# Patient Record
Sex: MALE | Race: WHITE | NOT HISPANIC OR LATINO | Employment: OTHER | RURAL
[De-identification: names, ages, dates, MRNs, and addresses within clinical notes are randomized per-mention and may not be internally consistent; named-entity substitution may affect disease eponyms.]

---

## 2021-10-28 ENCOUNTER — OFFICE VISIT (OUTPATIENT)
Dept: FAMILY MEDICINE | Facility: CLINIC | Age: 81
End: 2021-10-28
Payer: MEDICARE

## 2021-10-28 VITALS
BODY MASS INDEX: 25.68 KG/M2 | HEART RATE: 66 BPM | TEMPERATURE: 97 F | OXYGEN SATURATION: 97 % | SYSTOLIC BLOOD PRESSURE: 154 MMHG | WEIGHT: 173.38 LBS | HEIGHT: 69 IN | DIASTOLIC BLOOD PRESSURE: 74 MMHG

## 2021-10-28 DIAGNOSIS — E11.9 TYPE 2 DIABETES MELLITUS WITHOUT COMPLICATION, WITHOUT LONG-TERM CURRENT USE OF INSULIN: Primary | ICD-10-CM

## 2021-10-28 DIAGNOSIS — I10 ESSENTIAL HYPERTENSION: ICD-10-CM

## 2021-10-28 DIAGNOSIS — Z23 IMMUNIZATION DUE: ICD-10-CM

## 2021-10-28 DIAGNOSIS — M19.90 ARTHRITIS: ICD-10-CM

## 2021-10-28 LAB
EST. AVERAGE GLUCOSE BLD GHB EST-MCNC: 104 MG/DL
HBA1C MFR BLD HPLC: 5.7 % (ref 4.5–6.6)

## 2021-10-28 PROCEDURE — 99213 PR OFFICE/OUTPT VISIT, EST, LEVL III, 20-29 MIN: ICD-10-PCS | Mod: ,,, | Performed by: NURSE PRACTITIONER

## 2021-10-28 PROCEDURE — 90662 FLU VACCINE - QUADRIVALENT - HIGH DOSE (65+) PRESERVATIVE FREE IM: ICD-10-PCS | Mod: ,,, | Performed by: NURSE PRACTITIONER

## 2021-10-28 PROCEDURE — 90662 IIV NO PRSV INCREASED AG IM: CPT | Mod: ,,, | Performed by: NURSE PRACTITIONER

## 2021-10-28 PROCEDURE — 99213 OFFICE O/P EST LOW 20 MIN: CPT | Mod: ,,, | Performed by: NURSE PRACTITIONER

## 2021-10-28 PROCEDURE — G0008 FLU VACCINE - QUADRIVALENT - HIGH DOSE (65+) PRESERVATIVE FREE IM: ICD-10-PCS | Mod: ,,, | Performed by: NURSE PRACTITIONER

## 2021-10-28 PROCEDURE — G0008 ADMIN INFLUENZA VIRUS VAC: HCPCS | Mod: ,,, | Performed by: NURSE PRACTITIONER

## 2021-10-28 PROCEDURE — 83036 HEMOGLOBIN GLYCOSYLATED A1C: CPT | Mod: ,,, | Performed by: CLINICAL MEDICAL LABORATORY

## 2021-10-28 PROCEDURE — 83036 HEMOGLOBIN A1C: ICD-10-PCS | Mod: ,,, | Performed by: CLINICAL MEDICAL LABORATORY

## 2021-10-28 RX ORDER — AMLODIPINE BESYLATE 10 MG/1
10 TABLET ORAL DAILY
COMMUNITY
End: 2021-10-28 | Stop reason: SDUPTHER

## 2021-10-28 RX ORDER — CALCIUM CITRATE/VITAMIN D3 200MG-6.25
TABLET ORAL 2 TIMES DAILY
COMMUNITY
Start: 2021-08-25 | End: 2022-04-21 | Stop reason: SDUPTHER

## 2021-10-28 RX ORDER — GLIPIZIDE 5 MG/1
5 TABLET, FILM COATED, EXTENDED RELEASE ORAL DAILY
COMMUNITY
Start: 2021-08-25 | End: 2021-10-28 | Stop reason: SDUPTHER

## 2021-10-28 RX ORDER — ALLOPURINOL 300 MG/1
300 TABLET ORAL DAILY
Qty: 90 TABLET | Refills: 1 | Status: SHIPPED | OUTPATIENT
Start: 2021-10-28 | End: 2022-01-26

## 2021-10-28 RX ORDER — LISINOPRIL 40 MG/1
40 TABLET ORAL DAILY
Qty: 90 TABLET | Refills: 1 | Status: SHIPPED | OUTPATIENT
Start: 2021-10-28 | End: 2022-04-21 | Stop reason: SDUPTHER

## 2021-10-28 RX ORDER — ALLOPURINOL 300 MG/1
300 TABLET ORAL DAILY
COMMUNITY
End: 2021-10-28 | Stop reason: SDUPTHER

## 2021-10-28 RX ORDER — GLIPIZIDE 5 MG/1
5 TABLET, FILM COATED, EXTENDED RELEASE ORAL DAILY
Qty: 90 TABLET | Refills: 1 | Status: SHIPPED | OUTPATIENT
Start: 2021-10-28 | End: 2022-04-21 | Stop reason: SDUPTHER

## 2021-10-28 RX ORDER — LISINOPRIL 40 MG/1
40 TABLET ORAL DAILY
COMMUNITY
Start: 2021-07-27 | End: 2021-10-28 | Stop reason: SDUPTHER

## 2021-10-28 RX ORDER — AMLODIPINE BESYLATE 10 MG/1
10 TABLET ORAL DAILY
Qty: 90 TABLET | Refills: 1 | Status: SHIPPED | OUTPATIENT
Start: 2021-10-28 | End: 2022-04-21 | Stop reason: SDUPTHER

## 2021-11-19 ENCOUNTER — IMMUNIZATION (OUTPATIENT)
Dept: FAMILY MEDICINE | Facility: CLINIC | Age: 81
End: 2021-11-19
Payer: MEDICARE

## 2021-11-19 DIAGNOSIS — Z23 NEED FOR VACCINATION: Primary | ICD-10-CM

## 2021-11-19 PROCEDURE — 91306 COVID-19, MRNA, LNP-S, PF, 100 MCG/0.25 ML DOSE VACCINE (MODERNA BOOSTER): ICD-10-PCS | Mod: ,,, | Performed by: NURSE PRACTITIONER

## 2021-11-19 PROCEDURE — 91306 COVID-19, MRNA, LNP-S, PF, 100 MCG/0.25 ML DOSE VACCINE (MODERNA BOOSTER): CPT | Mod: ,,, | Performed by: NURSE PRACTITIONER

## 2021-11-19 PROCEDURE — 0064A COVID-19, MRNA, LNP-S, PF, 100 MCG/0.25 ML DOSE VACCINE (MODERNA BOOSTER): ICD-10-PCS | Mod: ,,, | Performed by: NURSE PRACTITIONER

## 2021-11-19 PROCEDURE — 0064A COVID-19, MRNA, LNP-S, PF, 100 MCG/0.25 ML DOSE VACCINE (MODERNA BOOSTER): CPT | Mod: ,,, | Performed by: NURSE PRACTITIONER

## 2022-03-11 DIAGNOSIS — Z71.89 COMPLEX CARE COORDINATION: ICD-10-CM

## 2022-04-21 ENCOUNTER — OFFICE VISIT (OUTPATIENT)
Dept: FAMILY MEDICINE | Facility: CLINIC | Age: 82
End: 2022-04-21
Payer: MEDICARE

## 2022-04-21 VITALS
SYSTOLIC BLOOD PRESSURE: 150 MMHG | WEIGHT: 185 LBS | BODY MASS INDEX: 27.4 KG/M2 | DIASTOLIC BLOOD PRESSURE: 69 MMHG | OXYGEN SATURATION: 97 % | HEART RATE: 83 BPM | TEMPERATURE: 98 F | HEIGHT: 69 IN

## 2022-04-21 DIAGNOSIS — M19.90 ARTHRITIS: ICD-10-CM

## 2022-04-21 DIAGNOSIS — E11.9 TYPE 2 DIABETES MELLITUS WITHOUT COMPLICATION, WITHOUT LONG-TERM CURRENT USE OF INSULIN: Primary | ICD-10-CM

## 2022-04-21 DIAGNOSIS — I10 ESSENTIAL HYPERTENSION: ICD-10-CM

## 2022-04-21 DIAGNOSIS — E78.5 HYPERLIPIDEMIA, UNSPECIFIED HYPERLIPIDEMIA TYPE: ICD-10-CM

## 2022-04-21 LAB
ALBUMIN SERPL BCP-MCNC: 3.7 G/DL (ref 3.5–5)
ALBUMIN/GLOB SERPL: 1.1 {RATIO}
ALP SERPL-CCNC: 81 U/L (ref 45–115)
ALT SERPL W P-5'-P-CCNC: 36 U/L (ref 16–61)
ANION GAP SERPL CALCULATED.3IONS-SCNC: 9 MMOL/L (ref 7–16)
AST SERPL W P-5'-P-CCNC: 30 U/L (ref 15–37)
BASOPHILS # BLD AUTO: 0.09 K/UL (ref 0–0.2)
BASOPHILS NFR BLD AUTO: 0.8 % (ref 0–1)
BILIRUB SERPL-MCNC: 0.3 MG/DL (ref 0–1.2)
BILIRUB SERPL-MCNC: NEGATIVE MG/DL
BLOOD URINE, POC: NEGATIVE
BUN SERPL-MCNC: 19 MG/DL (ref 7–18)
BUN/CREAT SERPL: 16 (ref 6–20)
CALCIUM SERPL-MCNC: 9.1 MG/DL (ref 8.5–10.1)
CHLORIDE SERPL-SCNC: 109 MMOL/L (ref 98–107)
CHOLEST SERPL-MCNC: 152 MG/DL (ref 0–200)
CHOLEST/HDLC SERPL: 5.1 {RATIO}
CO2 SERPL-SCNC: 29 MMOL/L (ref 21–32)
COLOR, POC UA: YELLOW
CREAT SERPL-MCNC: 1.19 MG/DL (ref 0.7–1.3)
CREAT UR-MCNC: 109 MG/DL (ref 39–259)
DIFFERENTIAL METHOD BLD: ABNORMAL
EOSINOPHIL # BLD AUTO: 0.16 K/UL (ref 0–0.5)
EOSINOPHIL NFR BLD AUTO: 1.4 % (ref 1–4)
ERYTHROCYTE [DISTWIDTH] IN BLOOD BY AUTOMATED COUNT: 13.5 % (ref 11.5–14.5)
GLOBULIN SER-MCNC: 3.5 G/DL (ref 2–4)
GLUCOSE SERPL-MCNC: 119 MG/DL (ref 74–106)
GLUCOSE UR QL STRIP: NEGATIVE
HCT VFR BLD AUTO: 44 % (ref 40–54)
HDLC SERPL-MCNC: 30 MG/DL (ref 40–60)
HGB BLD-MCNC: 14.7 G/DL (ref 13.5–18)
IMM GRANULOCYTES # BLD AUTO: 0.1 K/UL (ref 0–0.04)
IMM GRANULOCYTES NFR BLD: 0.9 % (ref 0–0.4)
KETONES UR QL STRIP: NEGATIVE
LDLC SERPL CALC-MCNC: 82 MG/DL
LDLC/HDLC SERPL: 2.7 {RATIO}
LEUKOCYTE ESTERASE URINE, POC: NEGATIVE
LYMPHOCYTES # BLD AUTO: 1.44 K/UL (ref 1–4.8)
LYMPHOCYTES NFR BLD AUTO: 13 % (ref 27–41)
MCH RBC QN AUTO: 32.9 PG (ref 27–31)
MCHC RBC AUTO-ENTMCNC: 33.4 G/DL (ref 32–36)
MCV RBC AUTO: 98.4 FL (ref 80–96)
MICROALBUMIN UR-MCNC: 5.4 MG/DL (ref 0–2.8)
MICROALBUMIN/CREAT RATIO PNL UR: 49.5 MG/G (ref 0–30)
MONOCYTES # BLD AUTO: 1.11 K/UL (ref 0–0.8)
MONOCYTES NFR BLD AUTO: 10 % (ref 2–6)
MPC BLD CALC-MCNC: 12.5 FL (ref 9.4–12.4)
NEUTROPHILS # BLD AUTO: 8.18 K/UL (ref 1.8–7.7)
NEUTROPHILS NFR BLD AUTO: 73.9 % (ref 53–65)
NITRITE, POC UA: NEGATIVE
NONHDLC SERPL-MCNC: 122 MG/DL
NRBC # BLD AUTO: 0 X10E3/UL
NRBC, AUTO (.00): 0 %
PH, POC UA: 5
PLATELET # BLD AUTO: 172 K/UL (ref 150–400)
POTASSIUM SERPL-SCNC: 4.6 MMOL/L (ref 3.5–5.1)
PROT SERPL-MCNC: 7.2 G/DL (ref 6.4–8.2)
PROTEIN, POC: NEGATIVE
RBC # BLD AUTO: 4.47 M/UL (ref 4.6–6.2)
SODIUM SERPL-SCNC: 142 MMOL/L (ref 136–145)
SPECIFIC GRAVITY, POC UA: 1.02
TRIGL SERPL-MCNC: 202 MG/DL (ref 35–150)
UROBILINOGEN, POC UA: 0.2
VLDLC SERPL-MCNC: 40 MG/DL
WBC # BLD AUTO: 11.08 K/UL (ref 4.5–11)

## 2022-04-21 PROCEDURE — 83036 HEMOGLOBIN GLYCOSYLATED A1C: CPT | Mod: ,,, | Performed by: CLINICAL MEDICAL LABORATORY

## 2022-04-21 PROCEDURE — 82043 UR ALBUMIN QUANTITATIVE: CPT | Mod: ,,, | Performed by: CLINICAL MEDICAL LABORATORY

## 2022-04-21 PROCEDURE — 80053 COMPREHEN METABOLIC PANEL: CPT | Mod: ,,, | Performed by: CLINICAL MEDICAL LABORATORY

## 2022-04-21 PROCEDURE — 83036 HEMOGLOBIN A1C: ICD-10-PCS | Mod: ,,, | Performed by: CLINICAL MEDICAL LABORATORY

## 2022-04-21 PROCEDURE — 80061 LIPID PANEL: ICD-10-PCS | Mod: ,,, | Performed by: CLINICAL MEDICAL LABORATORY

## 2022-04-21 PROCEDURE — 80061 LIPID PANEL: CPT | Mod: ,,, | Performed by: CLINICAL MEDICAL LABORATORY

## 2022-04-21 PROCEDURE — 81003 URINALYSIS AUTO W/O SCOPE: CPT | Mod: RHCUB | Performed by: REGISTERED NURSE

## 2022-04-21 PROCEDURE — 82570 ASSAY OF URINE CREATININE: CPT | Mod: ,,, | Performed by: CLINICAL MEDICAL LABORATORY

## 2022-04-21 PROCEDURE — 99214 PR OFFICE/OUTPT VISIT, EST, LEVL IV, 30-39 MIN: ICD-10-PCS | Mod: ,,, | Performed by: REGISTERED NURSE

## 2022-04-21 PROCEDURE — 99214 OFFICE O/P EST MOD 30 MIN: CPT | Mod: ,,, | Performed by: REGISTERED NURSE

## 2022-04-21 PROCEDURE — 80053 COMPREHENSIVE METABOLIC PANEL: ICD-10-PCS | Mod: ,,, | Performed by: CLINICAL MEDICAL LABORATORY

## 2022-04-21 PROCEDURE — 82043 MICROALBUMIN / CREATININE RATIO URINE: ICD-10-PCS | Mod: ,,, | Performed by: CLINICAL MEDICAL LABORATORY

## 2022-04-21 PROCEDURE — 85025 COMPLETE CBC W/AUTO DIFF WBC: CPT | Mod: ,,, | Performed by: CLINICAL MEDICAL LABORATORY

## 2022-04-21 PROCEDURE — 85025 CBC WITH DIFFERENTIAL: ICD-10-PCS | Mod: ,,, | Performed by: CLINICAL MEDICAL LABORATORY

## 2022-04-21 PROCEDURE — 82570 MICROALBUMIN / CREATININE RATIO URINE: ICD-10-PCS | Mod: ,,, | Performed by: CLINICAL MEDICAL LABORATORY

## 2022-04-21 RX ORDER — LISINOPRIL 40 MG/1
40 TABLET ORAL DAILY
Qty: 90 TABLET | Refills: 1 | Status: SHIPPED | OUTPATIENT
Start: 2022-04-21 | End: 2022-10-13 | Stop reason: SDUPTHER

## 2022-04-21 RX ORDER — GLIPIZIDE 5 MG/1
5 TABLET, FILM COATED, EXTENDED RELEASE ORAL DAILY
Qty: 90 TABLET | Refills: 1 | Status: SHIPPED | OUTPATIENT
Start: 2022-04-21 | End: 2022-10-13 | Stop reason: SDUPTHER

## 2022-04-21 RX ORDER — AMLODIPINE BESYLATE 10 MG/1
10 TABLET ORAL DAILY
Qty: 90 TABLET | Refills: 1 | Status: SHIPPED | OUTPATIENT
Start: 2022-04-21 | End: 2022-10-13 | Stop reason: SDUPTHER

## 2022-04-21 RX ORDER — ALLOPURINOL 300 MG/1
300 TABLET ORAL 2 TIMES DAILY
COMMUNITY
End: 2022-04-21 | Stop reason: SDUPTHER

## 2022-04-21 RX ORDER — CALCIUM CITRATE/VITAMIN D3 200MG-6.25
1 TABLET ORAL DAILY
Qty: 100 EACH | Refills: 1 | Status: SHIPPED | OUTPATIENT
Start: 2022-04-21 | End: 2022-10-13 | Stop reason: SDUPTHER

## 2022-04-21 RX ORDER — ALLOPURINOL 300 MG/1
300 TABLET ORAL DAILY
Qty: 90 TABLET | Refills: 1 | Status: SHIPPED | OUTPATIENT
Start: 2022-04-21 | End: 2022-10-13 | Stop reason: SDUPTHER

## 2022-04-21 NOTE — PROGRESS NOTES
EL Marie        PATIENT NAME: Baldev Albarado  : 1940  DATE: 22  MRN: [id number]      Billing Provider: EL Marie  Level of Service: [place] OFFICE/OUTPT VISIT, EST, LEVL IV, 30-39 MIN  Patient PCP Information     Provider PCP Type    EL Marie General          Reason for Visit / Chief Complaint: Medication Refill       Update PCP  Update Chief Complaint         History of Present Illness / Problem Focused Workflow     HPI Mr. Albarado is an 80 y/o WM here for diabetes and hypertension follow-up. He needs lab work and requests refills on his medication and diabetic testing supplies.     Review of Systems     Review of Systems   Constitutional: Negative.    HENT: Negative.    Respiratory: Negative for cough, chest tightness, shortness of breath and wheezing.    Cardiovascular: Negative for chest pain and leg swelling.   Gastrointestinal: Negative for abdominal pain, change in bowel habit, constipation, diarrhea, nausea, vomiting, reflux and change in bowel habit.   Genitourinary: Negative.    Musculoskeletal: Positive for arthralgias.   Neurological: Negative.    Psychiatric/Behavioral: Negative.         Medical / Social / Family History     Past Medical History:   Diagnosis Date    Arthritis     Diabetes mellitus, type 2     Hypertension        No past surgical history on file.    Social History  Mr. Baldev Albarado  reports that he has never smoked. He has never used smokeless tobacco. He reports that he does not drink alcohol and does not use drugs.    Family History  Mr. Baldev Albarado's family history is not on file.    Medications and Allergies     Medications  Outpatient Medications Marked as Taking for the 22 encounter (Office Visit) with EL Marie   Medication Sig Dispense Refill    [DISCONTINUED] allopurinoL (ZYLOPRIM) 300 MG tablet Take 300 mg by mouth 2 (two) times daily.      [DISCONTINUED] TRUE METRIX GLUCOSE TEST STRIP Strp 2  (two) times a day. Test         Allergies  Review of patient's allergies indicates:  No Known Allergies    Physical Examination     Vitals:    04/21/22 0916   BP: (!) 150/69   Pulse: 83   Temp: 97.7 °F (36.5 °C)     Physical Exam  Vitals and nursing note reviewed.   Constitutional:       Appearance: Normal appearance.   HENT:      Head: Normocephalic and atraumatic.   Cardiovascular:      Rate and Rhythm: Normal rate and regular rhythm.      Heart sounds: Normal heart sounds.   Pulmonary:      Effort: Pulmonary effort is normal.      Breath sounds: Normal breath sounds.   Abdominal:      General: Abdomen is flat. Bowel sounds are normal.      Palpations: Abdomen is soft.   Musculoskeletal:      Cervical back: Normal range of motion.      Comments: Osteoarthritis affecting multiple joints. History of gout. No acute symptoms of gout at this time.    Skin:     General: Skin is warm and dry.   Neurological:      Mental Status: He is alert and oriented to person, place, and time.   Psychiatric:         Behavior: Behavior normal.        Assessment and Plan (including Health Maintenance)      Problem List  Smart Sets  Document Outside HM   Plan:   Type 2 diabetes mellitus without complication, without long-term current use of insulin  -     glipiZIDE (GLUCOTROL) 5 MG TR24; Take 1 tablet (5 mg total) by mouth once daily.  Dispense: 90 tablet; Refill: 1  -     Hemoglobin A1C; Future; Expected date: 04/21/2022  -     TRUE METRIX GLUCOSE TEST STRIP Strp; Inject 1 strip into the skin once daily at 6am. Test for 180 doses  Dispense: 100 each; Refill: 1  -     POCT URINALYSIS W/O SCOPE    Essential hypertension  -     lisinopriL (PRINIVIL,ZESTRIL) 40 MG tablet; Take 1 tablet (40 mg total) by mouth once daily.  Dispense: 90 tablet; Refill: 1  -     amLODIPine (NORVASC) 10 MG tablet; Take 1 tablet (10 mg total) by mouth once daily.  Dispense: 90 tablet; Refill: 1  -     CBC Auto Differential; Future; Expected date: 04/21/2022  -      Comprehensive Metabolic Panel; Future; Expected date: 04/21/2022  -     Cancel: POCT Urinalysis; Future; Expected date: 04/21/2022  -     Microalbumin/Creatinine Ratio, Urine; Future  -     POCT URINALYSIS W/O SCOPE    Arthritis  -     allopurinoL (ZYLOPRIM) 300 MG tablet; Take 1 tablet (300 mg total) by mouth once daily.  Dispense: 90 tablet; Refill: 1    Hyperlipidemia, unspecified hyperlipidemia type  -     Lipid Panel; Future; Expected date: 04/21/2022       Health Maintenance Due   Topic Date Due    Foot Exam  Never done    Eye Exam  Never done    TETANUS VACCINE  Never done    Shingles Vaccine (1 of 2) Never done    Pneumococcal Vaccines (Age 65+) (1 - PCV) Never done    COVID-19 Vaccine (4 - Booster for Moderna series) 03/19/2022       Problem List Items Addressed This Visit    None     Visit Diagnoses     Type 2 diabetes mellitus without complication, without long-term current use of insulin    -  Primary    Relevant Medications    glipiZIDE (GLUCOTROL) 5 MG TR24    TRUE METRIX GLUCOSE TEST STRIP Strp    Other Relevant Orders    Hemoglobin A1C (Completed)    POCT URINALYSIS W/O SCOPE (Completed)    Essential hypertension        Relevant Medications    lisinopriL (PRINIVIL,ZESTRIL) 40 MG tablet    amLODIPine (NORVASC) 10 MG tablet    Other Relevant Orders    CBC Auto Differential (Completed)    Comprehensive Metabolic Panel (Completed)    Microalbumin/Creatinine Ratio, Urine (Completed)    POCT URINALYSIS W/O SCOPE (Completed)    Arthritis        Relevant Medications    allopurinoL (ZYLOPRIM) 300 MG tablet    Hyperlipidemia, unspecified hyperlipidemia type        Relevant Orders    Lipid Panel (Completed)          Health Maintenance Topics with due status: Not Due       Topic Last Completion Date    Diabetes Urine Screening 04/21/2022    Lipid Panel 04/21/2022    Hemoglobin A1c 04/21/2022     No future appointments.     Patient Instructions   Take all medications as prescribed. Do not stop or start any  medication(s) without consulting with your primary care provider or specialist. Make sure you are drinking plenty of water unless on a fluid restriction.   Cut salt intake, try to restrict sodium to 2 grams or less. Avoid fried foods and eat carbohydrates such as bread, pasta, and potatoes in moderation.    Follow up in about 6 months (around 10/21/2022).     Signature:  EL Marie      Date of encounter: 4/21/22

## 2022-04-22 LAB
EST. AVERAGE GLUCOSE BLD GHB EST-MCNC: 117 MG/DL
HBA1C MFR BLD HPLC: 6.1 % (ref 4.5–6.6)

## 2022-04-25 ENCOUNTER — TELEPHONE (OUTPATIENT)
Dept: FAMILY MEDICINE | Facility: CLINIC | Age: 82
End: 2022-04-25
Payer: MEDICARE

## 2022-04-25 NOTE — TELEPHONE ENCOUNTER
----- Message from EL Marie sent at 4/24/2022  8:08 PM CDT -----  Please let Mr. Albarado know his lab work looks okay. His A1C is good but his triglycerides and cholesterol are elevated. He needs to cut back on the fried foods, sandwich meats and hot dogs if he eats these, and whole dairy products. Encourage him to make sure he drinks plenty of water. No medication changes needed at this time.

## 2022-05-02 NOTE — PATIENT INSTRUCTIONS
Take all medications as prescribed. Do not stop or start any medication(s) without consulting with your primary care provider or specialist. Make sure you are drinking plenty of water unless on a fluid restriction.   Cut salt intake, try to restrict sodium to 2 grams or less. Avoid fried foods and eat carbohydrates such as bread, pasta, and potatoes in moderation.

## 2022-10-09 DIAGNOSIS — Z71.89 COMPLEX CARE COORDINATION: ICD-10-CM

## 2022-10-13 ENCOUNTER — OFFICE VISIT (OUTPATIENT)
Dept: FAMILY MEDICINE | Facility: CLINIC | Age: 82
End: 2022-10-13
Payer: MEDICARE

## 2022-10-13 VITALS
WEIGHT: 170 LBS | HEART RATE: 62 BPM | BODY MASS INDEX: 25.18 KG/M2 | TEMPERATURE: 97 F | DIASTOLIC BLOOD PRESSURE: 71 MMHG | SYSTOLIC BLOOD PRESSURE: 176 MMHG | HEIGHT: 69 IN

## 2022-10-13 DIAGNOSIS — Z23 FLU VACCINE NEED: ICD-10-CM

## 2022-10-13 DIAGNOSIS — N40.0 BENIGN PROSTATIC HYPERPLASIA, UNSPECIFIED WHETHER LOWER URINARY TRACT SYMPTOMS PRESENT: ICD-10-CM

## 2022-10-13 DIAGNOSIS — M19.90 ARTHRITIS: ICD-10-CM

## 2022-10-13 DIAGNOSIS — E11.9 TYPE 2 DIABETES MELLITUS WITHOUT COMPLICATION, WITHOUT LONG-TERM CURRENT USE OF INSULIN: ICD-10-CM

## 2022-10-13 DIAGNOSIS — R30.0 DYSURIA: Primary | ICD-10-CM

## 2022-10-13 DIAGNOSIS — I10 ESSENTIAL HYPERTENSION: ICD-10-CM

## 2022-10-13 LAB
ANION GAP SERPL CALCULATED.3IONS-SCNC: 10 MMOL/L (ref 7–16)
BUN SERPL-MCNC: 23 MG/DL (ref 7–18)
BUN/CREAT SERPL: 19 (ref 6–20)
CALCIUM SERPL-MCNC: 9 MG/DL (ref 8.5–10.1)
CHLORIDE SERPL-SCNC: 110 MMOL/L (ref 98–107)
CHOLEST SERPL-MCNC: 135 MG/DL (ref 0–200)
CHOLEST/HDLC SERPL: 3.9 {RATIO}
CO2 SERPL-SCNC: 27 MMOL/L (ref 21–32)
CREAT SERPL-MCNC: 1.21 MG/DL (ref 0.7–1.3)
EGFR (NO RACE VARIABLE) (RUSH/TITUS): 59 ML/MIN/1.73M²
EST. AVERAGE GLUCOSE BLD GHB EST-MCNC: 100 MG/DL
GLUCOSE SERPL-MCNC: 76 MG/DL (ref 74–106)
HBA1C MFR BLD HPLC: 5.6 % (ref 4.5–6.6)
HDLC SERPL-MCNC: 35 MG/DL (ref 40–60)
LDLC SERPL CALC-MCNC: 82 MG/DL
LDLC/HDLC SERPL: 2.3 {RATIO}
NONHDLC SERPL-MCNC: 100 MG/DL
POTASSIUM SERPL-SCNC: 4.4 MMOL/L (ref 3.5–5.1)
SODIUM SERPL-SCNC: 143 MMOL/L (ref 136–145)
TRIGL SERPL-MCNC: 91 MG/DL (ref 35–150)
VLDLC SERPL-MCNC: 18 MG/DL

## 2022-10-13 PROCEDURE — 90694 VACC AIIV4 NO PRSRV 0.5ML IM: CPT | Mod: ,,, | Performed by: STUDENT IN AN ORGANIZED HEALTH CARE EDUCATION/TRAINING PROGRAM

## 2022-10-13 PROCEDURE — 90694 FLU VACCINE - QUADRIVALENT - ADJUVANTED: ICD-10-PCS | Mod: ,,, | Performed by: STUDENT IN AN ORGANIZED HEALTH CARE EDUCATION/TRAINING PROGRAM

## 2022-10-13 PROCEDURE — 99215 PR OFFICE/OUTPT VISIT, EST, LEVL V, 40-54 MIN: ICD-10-PCS | Mod: ,,, | Performed by: STUDENT IN AN ORGANIZED HEALTH CARE EDUCATION/TRAINING PROGRAM

## 2022-10-13 PROCEDURE — 80048 BASIC METABOLIC PNL TOTAL CA: CPT | Mod: ,,, | Performed by: CLINICAL MEDICAL LABORATORY

## 2022-10-13 PROCEDURE — G0008 ADMIN INFLUENZA VIRUS VAC: HCPCS | Mod: ,,, | Performed by: STUDENT IN AN ORGANIZED HEALTH CARE EDUCATION/TRAINING PROGRAM

## 2022-10-13 PROCEDURE — G0008 FLU VACCINE - QUADRIVALENT - ADJUVANTED: ICD-10-PCS | Mod: ,,, | Performed by: STUDENT IN AN ORGANIZED HEALTH CARE EDUCATION/TRAINING PROGRAM

## 2022-10-13 PROCEDURE — 99215 OFFICE O/P EST HI 40 MIN: CPT | Mod: ,,, | Performed by: STUDENT IN AN ORGANIZED HEALTH CARE EDUCATION/TRAINING PROGRAM

## 2022-10-13 PROCEDURE — 83036 HEMOGLOBIN GLYCOSYLATED A1C: CPT | Mod: ,,, | Performed by: CLINICAL MEDICAL LABORATORY

## 2022-10-13 PROCEDURE — 80048 BASIC METABOLIC PANEL: ICD-10-PCS | Mod: ,,, | Performed by: CLINICAL MEDICAL LABORATORY

## 2022-10-13 PROCEDURE — 83036 HEMOGLOBIN A1C: ICD-10-PCS | Mod: ,,, | Performed by: CLINICAL MEDICAL LABORATORY

## 2022-10-13 PROCEDURE — 80061 LIPID PANEL: ICD-10-PCS | Mod: ,,, | Performed by: CLINICAL MEDICAL LABORATORY

## 2022-10-13 PROCEDURE — 80061 LIPID PANEL: CPT | Mod: ,,, | Performed by: CLINICAL MEDICAL LABORATORY

## 2022-10-13 RX ORDER — LISINOPRIL 40 MG/1
40 TABLET ORAL DAILY
Qty: 90 TABLET | Refills: 1 | Status: SHIPPED | OUTPATIENT
Start: 2022-10-13 | End: 2023-04-20 | Stop reason: SDUPTHER

## 2022-10-13 RX ORDER — TAMSULOSIN HYDROCHLORIDE 0.4 MG/1
0.4 CAPSULE ORAL DAILY
Qty: 30 CAPSULE | Refills: 11 | Status: SHIPPED | OUTPATIENT
Start: 2022-10-13 | End: 2023-04-20 | Stop reason: SDUPTHER

## 2022-10-13 RX ORDER — ALLOPURINOL 300 MG/1
300 TABLET ORAL DAILY
Qty: 90 TABLET | Refills: 1 | Status: SHIPPED | OUTPATIENT
Start: 2022-10-13 | End: 2023-08-07

## 2022-10-13 RX ORDER — CALCIUM CITRATE/VITAMIN D3 200MG-6.25
1 TABLET ORAL DAILY
Qty: 100 EACH | Refills: 1 | Status: SHIPPED | OUTPATIENT
Start: 2022-10-13 | End: 2023-04-11

## 2022-10-13 RX ORDER — GLIPIZIDE 5 MG/1
5 TABLET, FILM COATED, EXTENDED RELEASE ORAL DAILY
Qty: 90 TABLET | Refills: 1 | Status: SHIPPED | OUTPATIENT
Start: 2022-10-13 | End: 2023-04-20 | Stop reason: SDUPTHER

## 2022-10-13 RX ORDER — AMLODIPINE BESYLATE 10 MG/1
10 TABLET ORAL DAILY
Qty: 90 TABLET | Refills: 1 | Status: SHIPPED | OUTPATIENT
Start: 2022-10-13 | End: 2023-04-20 | Stop reason: SDUPTHER

## 2022-10-13 NOTE — PROGRESS NOTES
"Subjective:       Patient ID: Baldev Albarado is a 82 y.o. male.    Chief Complaint: Dysuria (Feels like he needs to go and only dripples), Medication Refill, and Pain (Left elbow pain x2days)    HPI    82-year-old man with the medical problems listed below who comes to clinic for refill of their medication and follow-up for their chronic medical conditions       He reports increased occasional burning and increased frequency. Feels like he has trouble with his stream. Previously was taking tamsulosin, which helped with this, but he is no longer taking. He previously saw urologist in [place]; Dr. Holman.      Regarding his blood pressure, he is taking Lisinopril 40 mg daily and Amlodipine 10 mg daily. He has BP maching at home and reports it is normally 130/80. He reports adherence to his regimen.     Patient Active Problem List   Diagnosis    Arthritis    Essential hypertension    Dysuria    Flu vaccine need    Benign prostatic hyperplasia    Type 2 diabetes mellitus without complication, without long-term current use of insulin         Objective:    BP (!) 176/71   Pulse 62   Temp 96.7 °F (35.9 °C)   Ht 5' 9" (1.753 m)   Wt 77.1 kg (170 lb)   BMI 25.10 kg/m²     Physical Exam    Gen: well-groomed, well-dressed. No apparent distress  HEENT:  NCAT, symmetric  Pulm: normal work of breathing, no accessory muscle use  Neuro: CN II-XII grossly normal   Psych: pleasant affect, congruent mood. Linear thought; good eye contact  SKIN: no rashes present on face, neck, hands    Assessment / Plan:       Problem List Items Addressed This Visit          Cardiac/Vascular    Essential hypertension     Markedly elevated in clinic today; continue current regimen; emphasized complete adherence. Monitor at home and RTC in 4 weeks to clinic with BP log. Pt expressed understanding and agreement with this plan.            Relevant Medications    amLODIPine (NORVASC) 10 MG tablet    lisinopriL (PRINIVIL,ZESTRIL) 40 MG tablet    " Other Relevant Orders    Basic Metabolic Panel (Completed)    Lipid Panel (Completed)       Renal/    Dysuria - Primary     Restart tamsulosin; likely from BPH; no s/s suggestive of UTI at this time. If not improvement with trial of flomax should have repeat urologic evaluation with Dr. Holman.          Benign prostatic hyperplasia    Relevant Medications    tamsulosin (FLOMAX) 0.4 mg Cap       ID    Flu vaccine need    Relevant Orders    Influenza (FLUAD) - Quadrivalent (Adjuvanted) *Preferred* (65+) (PF) (Completed)       Endocrine    Type 2 diabetes mellitus without complication, without long-term current use of insulin     No reported episodes of hypoglycemia; continue current regimen; check A1c today.          Relevant Medications    glipiZIDE 5 MG TR24    TRUE METRIX GLUCOSE TEST STRIP Strp    Other Relevant Orders    Hemoglobin A1C (Completed)       Orthopedic    Arthritis    Relevant Medications    allopurinoL (ZYLOPRIM) 300 MG tablet       Face to face encounter time 30 minutes.    Non face to face encounter time 30 minutes.  Reviewing separate obtained history, counseling and educating the patient, family and/or other caregivers, ordering medications, tests or procedures; referring and communicating with other health care professionals; documenting clinical information in the electronic medical record; care coordination; independently interpreting results and communicating results to the patient, family, and/or caregivers.    Total time for visit  60 minutes

## 2022-10-14 ENCOUNTER — TELEPHONE (OUTPATIENT)
Dept: FAMILY MEDICINE | Facility: CLINIC | Age: 82
End: 2022-10-14
Payer: MEDICARE

## 2022-10-14 NOTE — TELEPHONE ENCOUNTER
----- Message from Isidro Rosenthal MD sent at 10/14/2022 10:22 AM CDT -----  Please contact the patient and let them know that their results were fine and do not require any change in treatment.

## 2022-10-18 PROBLEM — E11.9 TYPE 2 DIABETES MELLITUS WITHOUT COMPLICATION, WITHOUT LONG-TERM CURRENT USE OF INSULIN: Status: ACTIVE | Noted: 2022-10-18

## 2022-10-18 NOTE — ASSESSMENT & PLAN NOTE
Restart tamsulosin; likely from BPH; no s/s suggestive of UTI at this time. If not improvement with trial of flomax should have repeat urologic evaluation with Dr. Holman.

## 2022-10-18 NOTE — ASSESSMENT & PLAN NOTE
Markedly elevated in clinic today; continue current regimen; emphasized complete adherence. Monitor at home and RTC in 4 weeks to clinic with BP log. Pt expressed understanding and agreement with this plan.

## 2022-11-14 ENCOUNTER — OFFICE VISIT (OUTPATIENT)
Dept: FAMILY MEDICINE | Facility: CLINIC | Age: 82
End: 2022-11-14
Payer: MEDICARE

## 2022-11-14 VITALS
HEART RATE: 82 BPM | SYSTOLIC BLOOD PRESSURE: 160 MMHG | HEIGHT: 69 IN | TEMPERATURE: 98 F | RESPIRATION RATE: 18 BRPM | DIASTOLIC BLOOD PRESSURE: 72 MMHG | WEIGHT: 176.38 LBS | OXYGEN SATURATION: 96 % | BODY MASS INDEX: 26.12 KG/M2

## 2022-11-14 DIAGNOSIS — N40.0 BENIGN PROSTATIC HYPERPLASIA, UNSPECIFIED WHETHER LOWER URINARY TRACT SYMPTOMS PRESENT: Primary | ICD-10-CM

## 2022-11-14 PROCEDURE — 99213 OFFICE O/P EST LOW 20 MIN: CPT | Mod: ,,, | Performed by: REGISTERED NURSE

## 2022-11-14 PROCEDURE — 99213 PR OFFICE/OUTPT VISIT, EST, LEVL III, 20-29 MIN: ICD-10-PCS | Mod: ,,, | Performed by: REGISTERED NURSE

## 2022-11-14 NOTE — PROGRESS NOTES
EL Marie        PATIENT NAME: Baldev Albarado  : 1940  DATE: 22  MRN: [id number]      Billing Provider: EL Marie  Level of Service: [place] OFFICE/OUTPT VISIT, EST, LEVL III, 20-29 MIN  Patient PCP Information       Provider PCP Type    EL Marie General            Reason for Visit / Chief Complaint: Follow-up (1 month started new medication)       Update PCP  Update Chief Complaint         History of Present Illness / Problem Focused Workflow     HPI Mr. Albarado is an 81 y/o WM here for BPH follow-up. He was started on Flomax in October by Dr. Rosenthal and instructed to follow-up with PCP in 3-4 weeks to assess for therapeutic effects of medication. No questions or concerns verbalized, patient reports frequency of voiding has decreased, he feels he is emptying his bladder, has good amount of urine output when urinating.     Review of Systems     Review of Systems   Constitutional: Negative.    Respiratory: Negative.     Cardiovascular: Negative.    Gastrointestinal: Negative.    Genitourinary:  Negative for bladder incontinence, difficulty urinating, dysuria, flank pain, frequency and urgency.   Musculoskeletal:  Positive for arthralgias.      Medical / Social / Family History     Past Medical History:   Diagnosis Date    Arthritis     Diabetes mellitus, type 2     Hypertension        History reviewed. No pertinent surgical history.    Social History  Mr. Baldev Albarado  reports that he has never smoked. He has never used smokeless tobacco. He reports that he does not drink alcohol and does not use drugs.    Family History  Mr. Baldev Albarado's family history is not on file.    Medications and Allergies     Medications  Outpatient Medications Marked as Taking for the 22 encounter (Office Visit) with EL Marie   Medication Sig Dispense Refill    allopurinoL (ZYLOPRIM) 300 MG tablet Take 1 tablet (300 mg total) by mouth once daily. 90 tablet  1    amLODIPine (NORVASC) 10 MG tablet Take 1 tablet (10 mg total) by mouth once daily. 90 tablet 1    glipiZIDE 5 MG TR24 Take 1 tablet (5 mg total) by mouth once daily. 90 tablet 1    lisinopriL (PRINIVIL,ZESTRIL) 40 MG tablet Take 1 tablet (40 mg total) by mouth once daily. 90 tablet 1    tamsulosin (FLOMAX) 0.4 mg Cap Take 1 capsule (0.4 mg total) by mouth once daily. 30 capsule 11    TRUE METRIX GLUCOSE TEST STRIP Strp Inject 1 strip into the skin once daily. Test for 180 doses 100 each 1     Allergies  Review of patient's allergies indicates:  No Known Allergies    Physical Examination     Vitals:    11/14/22 0848   BP: (!) 160/72   Pulse:    Resp:    Temp:      Physical Exam  Vitals and nursing note reviewed.   Constitutional:       Appearance: Normal appearance.   HENT:      Head: Normocephalic and atraumatic.   Cardiovascular:      Rate and Rhythm: Normal rate and regular rhythm.      Heart sounds: Normal heart sounds.   Pulmonary:      Effort: Pulmonary effort is normal.      Breath sounds: Normal breath sounds.   Musculoskeletal:         General: Normal range of motion.      Cervical back: Normal range of motion.   Skin:     General: Skin is warm and dry.   Neurological:      Mental Status: He is alert and oriented to person, place, and time.        Assessment and Plan (including Health Maintenance)      Problem List  Smart Sets  Document Outside HM   Plan:   Benign prostatic hyperplasia, unspecified whether lower urinary tract symptoms present       Continue current plan of care, return to clinic if condition worsens or persists. Will refer to Urologist if condition requires further intervention.     Health Maintenance Due   Topic Date Due    Pneumococcal Vaccines (Age 65+) (1 - PCV) Never done    Eye Exam  Never done    COVID-19 Vaccine (4 - Booster for Moderna series) 01/14/2022       Problem List Items Addressed This Visit          Renal/    Benign prostatic hyperplasia - Primary       Health  Maintenance Topics with due status: Not Due       Topic Last Completion Date    Diabetes Urine Screening 04/21/2022    Lipid Panel 10/13/2022    Hemoglobin A1c 10/13/2022       No future appointments.     Patient Instructions   Return for next appointment in 3-6 months, sooner if needed. Take all medications as prescribed. Do not stop or start any new medications without consulting with your provider. If you have access to blood pressure monitor at home, may check blood pressure as needed if symptoms of high or low blood pressure evident.    Follow up in about 5 months (around 4/14/2023).     Signature:  EL Marie    Date of encounter: 11/14/22

## 2023-04-20 ENCOUNTER — OFFICE VISIT (OUTPATIENT)
Dept: FAMILY MEDICINE | Facility: CLINIC | Age: 83
End: 2023-04-20
Payer: MEDICARE

## 2023-04-20 VITALS
HEIGHT: 69 IN | WEIGHT: 173.38 LBS | TEMPERATURE: 98 F | HEART RATE: 78 BPM | BODY MASS INDEX: 25.68 KG/M2 | OXYGEN SATURATION: 98 % | DIASTOLIC BLOOD PRESSURE: 67 MMHG | SYSTOLIC BLOOD PRESSURE: 146 MMHG

## 2023-04-20 DIAGNOSIS — I10 ESSENTIAL HYPERTENSION: Primary | ICD-10-CM

## 2023-04-20 DIAGNOSIS — E11.9 TYPE 2 DIABETES MELLITUS WITHOUT COMPLICATION, WITHOUT LONG-TERM CURRENT USE OF INSULIN: ICD-10-CM

## 2023-04-20 DIAGNOSIS — N40.0 BENIGN PROSTATIC HYPERPLASIA, UNSPECIFIED WHETHER LOWER URINARY TRACT SYMPTOMS PRESENT: ICD-10-CM

## 2023-04-20 DIAGNOSIS — R30.0 DYSURIA: ICD-10-CM

## 2023-04-20 LAB
ALBUMIN SERPL BCP-MCNC: 3.5 G/DL (ref 3.5–5)
ALBUMIN/GLOB SERPL: 1 {RATIO}
ALP SERPL-CCNC: 80 U/L (ref 45–115)
ALT SERPL W P-5'-P-CCNC: 29 U/L (ref 16–61)
ANION GAP SERPL CALCULATED.3IONS-SCNC: 10 MMOL/L (ref 7–16)
AST SERPL W P-5'-P-CCNC: 24 U/L (ref 15–37)
BASOPHILS # BLD AUTO: 0.08 K/UL (ref 0–0.2)
BASOPHILS NFR BLD AUTO: 0.8 % (ref 0–1)
BILIRUB SERPL-MCNC: 0.3 MG/DL (ref ?–1.2)
BILIRUB SERPL-MCNC: NORMAL MG/DL
BILIRUB UR QL STRIP: NEGATIVE
BLOOD URINE, POC: NORMAL
BUN SERPL-MCNC: 26 MG/DL (ref 7–18)
BUN/CREAT SERPL: 24 (ref 6–20)
CALCIUM SERPL-MCNC: 9.2 MG/DL (ref 8.5–10.1)
CHLORIDE SERPL-SCNC: 113 MMOL/L (ref 98–107)
CHOLEST SERPL-MCNC: 148 MG/DL (ref 0–200)
CHOLEST/HDLC SERPL: 4.2 {RATIO}
CLARITY UR: CLEAR
CO2 SERPL-SCNC: 24 MMOL/L (ref 21–32)
COLOR UR: NORMAL
COLOR, POC UA: YELLOW
CREAT SERPL-MCNC: 1.1 MG/DL (ref 0.7–1.3)
CREAT UR-MCNC: 111 MG/DL (ref 39–259)
DIFFERENTIAL METHOD BLD: ABNORMAL
EGFR (NO RACE VARIABLE) (RUSH/TITUS): 66 ML/MIN/1.73M²
EOSINOPHIL # BLD AUTO: 0.12 K/UL (ref 0–0.5)
EOSINOPHIL NFR BLD AUTO: 1.2 % (ref 1–4)
ERYTHROCYTE [DISTWIDTH] IN BLOOD BY AUTOMATED COUNT: 13.4 % (ref 11.5–14.5)
EST. AVERAGE GLUCOSE BLD GHB EST-MCNC: 107 MG/DL
GLOBULIN SER-MCNC: 3.5 G/DL (ref 2–4)
GLUCOSE SERPL-MCNC: 117 MG/DL (ref 74–106)
GLUCOSE UR QL STRIP: NORMAL
GLUCOSE UR STRIP-MCNC: NORMAL MG/DL
HBA1C MFR BLD HPLC: 5.8 % (ref 4.5–6.6)
HCT VFR BLD AUTO: 42.7 % (ref 40–54)
HDLC SERPL-MCNC: 35 MG/DL (ref 40–60)
HGB BLD-MCNC: 13.6 G/DL (ref 13.5–18)
IMM GRANULOCYTES # BLD AUTO: 0.1 K/UL (ref 0–0.04)
IMM GRANULOCYTES NFR BLD: 1 % (ref 0–0.4)
KETONES UR QL STRIP: NORMAL
KETONES UR STRIP-SCNC: NEGATIVE MG/DL
LDLC SERPL CALC-MCNC: 91 MG/DL
LDLC/HDLC SERPL: 2.6 {RATIO}
LEUKOCYTE ESTERASE UR QL STRIP: NEGATIVE
LEUKOCYTE ESTERASE URINE, POC: NORMAL
LYMPHOCYTES # BLD AUTO: 1.39 K/UL (ref 1–4.8)
LYMPHOCYTES NFR BLD AUTO: 13.8 % (ref 27–41)
MCH RBC QN AUTO: 31.2 PG (ref 27–31)
MCHC RBC AUTO-ENTMCNC: 31.9 G/DL (ref 32–36)
MCV RBC AUTO: 97.9 FL (ref 80–96)
MICROALBUMIN UR-MCNC: 3.8 MG/DL (ref 0–2.8)
MICROALBUMIN/CREAT RATIO PNL UR: 34.2 MG/G (ref 0–30)
MONOCYTES # BLD AUTO: 0.84 K/UL (ref 0–0.8)
MONOCYTES NFR BLD AUTO: 8.4 % (ref 2–6)
MPC BLD CALC-MCNC: 12.1 FL (ref 9.4–12.4)
NEUTROPHILS # BLD AUTO: 7.51 K/UL (ref 1.8–7.7)
NEUTROPHILS NFR BLD AUTO: 74.8 % (ref 53–65)
NITRITE UR QL STRIP: NEGATIVE
NITRITE, POC UA: NORMAL
NONHDLC SERPL-MCNC: 113 MG/DL
NRBC # BLD AUTO: 0 X10E3/UL
NRBC, AUTO (.00): 0 %
PH UR STRIP: 5.5 PH UNITS
PH, POC UA: 5.5
PLATELET # BLD AUTO: 176 K/UL (ref 150–400)
POTASSIUM SERPL-SCNC: 4 MMOL/L (ref 3.5–5.1)
PROT SERPL-MCNC: 7 G/DL (ref 6.4–8.2)
PROT UR QL STRIP: NEGATIVE
PROTEIN, POC: NORMAL
RBC # BLD AUTO: 4.36 M/UL (ref 4.6–6.2)
RBC # UR STRIP: NEGATIVE /UL
SODIUM SERPL-SCNC: 143 MMOL/L (ref 136–145)
SP GR UR STRIP: 1.02
SPECIFIC GRAVITY, POC UA: 1.02
TRIGL SERPL-MCNC: 110 MG/DL (ref 35–150)
UROBILINOGEN UR STRIP-ACNC: NORMAL MG/DL
UROBILINOGEN, POC UA: 0.2
VLDLC SERPL-MCNC: 22 MG/DL
WBC # BLD AUTO: 10.04 K/UL (ref 4.5–11)

## 2023-04-20 PROCEDURE — 82570 ASSAY OF URINE CREATININE: CPT | Mod: ,,, | Performed by: CLINICAL MEDICAL LABORATORY

## 2023-04-20 PROCEDURE — 81003 URINALYSIS AUTO W/O SCOPE: CPT | Mod: QW,,, | Performed by: CLINICAL MEDICAL LABORATORY

## 2023-04-20 PROCEDURE — 80053 COMPREHENSIVE METABOLIC PANEL: ICD-10-PCS | Mod: ,,, | Performed by: CLINICAL MEDICAL LABORATORY

## 2023-04-20 PROCEDURE — 81003 URINALYSIS AUTO W/O SCOPE: CPT | Mod: RHCUB | Performed by: REGISTERED NURSE

## 2023-04-20 PROCEDURE — 99213 OFFICE O/P EST LOW 20 MIN: CPT | Mod: ,,, | Performed by: REGISTERED NURSE

## 2023-04-20 PROCEDURE — 85025 COMPLETE CBC W/AUTO DIFF WBC: CPT | Mod: ,,, | Performed by: CLINICAL MEDICAL LABORATORY

## 2023-04-20 PROCEDURE — 80061 LIPID PANEL: ICD-10-PCS | Mod: ,,, | Performed by: CLINICAL MEDICAL LABORATORY

## 2023-04-20 PROCEDURE — 85025 CBC WITH DIFFERENTIAL: ICD-10-PCS | Mod: ,,, | Performed by: CLINICAL MEDICAL LABORATORY

## 2023-04-20 PROCEDURE — 82043 UR ALBUMIN QUANTITATIVE: CPT | Mod: ,,, | Performed by: CLINICAL MEDICAL LABORATORY

## 2023-04-20 PROCEDURE — 80061 LIPID PANEL: CPT | Mod: ,,, | Performed by: CLINICAL MEDICAL LABORATORY

## 2023-04-20 PROCEDURE — 82043 MICROALBUMIN / CREATININE RATIO URINE: ICD-10-PCS | Mod: ,,, | Performed by: CLINICAL MEDICAL LABORATORY

## 2023-04-20 PROCEDURE — 83036 HEMOGLOBIN GLYCOSYLATED A1C: CPT | Mod: ,,, | Performed by: CLINICAL MEDICAL LABORATORY

## 2023-04-20 PROCEDURE — 80053 COMPREHEN METABOLIC PANEL: CPT | Mod: ,,, | Performed by: CLINICAL MEDICAL LABORATORY

## 2023-04-20 PROCEDURE — 99213 PR OFFICE/OUTPT VISIT, EST, LEVL III, 20-29 MIN: ICD-10-PCS | Mod: ,,, | Performed by: REGISTERED NURSE

## 2023-04-20 PROCEDURE — 81003 URINALYSIS, REFLEX TO URINE CULTURE: ICD-10-PCS | Mod: QW,,, | Performed by: CLINICAL MEDICAL LABORATORY

## 2023-04-20 PROCEDURE — 82570 MICROALBUMIN / CREATININE RATIO URINE: ICD-10-PCS | Mod: ,,, | Performed by: CLINICAL MEDICAL LABORATORY

## 2023-04-20 PROCEDURE — 83036 HEMOGLOBIN A1C: ICD-10-PCS | Mod: ,,, | Performed by: CLINICAL MEDICAL LABORATORY

## 2023-04-20 RX ORDER — LISINOPRIL 40 MG/1
40 TABLET ORAL DAILY
Qty: 90 TABLET | Refills: 1 | Status: SHIPPED | OUTPATIENT
Start: 2023-04-20 | End: 2023-09-07 | Stop reason: SDUPTHER

## 2023-04-20 RX ORDER — TAMSULOSIN HYDROCHLORIDE 0.4 MG/1
0.4 CAPSULE ORAL DAILY
Qty: 90 CAPSULE | Refills: 1 | Status: SHIPPED | OUTPATIENT
Start: 2023-04-20 | End: 2023-09-07 | Stop reason: SDUPTHER

## 2023-04-20 RX ORDER — AMLODIPINE BESYLATE 10 MG/1
10 TABLET ORAL DAILY
Qty: 90 TABLET | Refills: 1 | Status: SHIPPED | OUTPATIENT
Start: 2023-04-20 | End: 2023-09-07

## 2023-04-20 RX ORDER — GLIPIZIDE 5 MG/1
5 TABLET, FILM COATED, EXTENDED RELEASE ORAL DAILY
Qty: 90 TABLET | Refills: 1 | Status: SHIPPED | OUTPATIENT
Start: 2023-04-20 | End: 2023-05-01 | Stop reason: DRUGHIGH

## 2023-04-20 NOTE — PROGRESS NOTES
Pt notified  He was having some burning with urination last week and he said it had cleared up but granddaughter wanted him to be checked just to make sure.

## 2023-04-20 NOTE — PROGRESS NOTES
EL Marie        PATIENT NAME: Baldev Albarado  : 1940  DATE: 23  MRN: [id number]      Billing Provider: EL Marie  Level of Service: [place] OFFICE/OUTPT VISIT, EST, LEVL III, 20-29 MIN  Patient PCP Information       Provider PCP Type    EL Marie General            Reason for Visit / Chief Complaint: Follow-up, Medication Refill, Urinary Tract Infection (Had some symptoms last week and wants to be sure it's cleared up now), and Insomnia (Has had trouble sleeping since his wife  2 months ago)       Update PCP  Update Chief Complaint         History of Present Illness / Problem Focused Workflow     HPI Mr. Albarado is an 83-year-old WM here for chronic disease follow-up and medication refills. He is due for lab work, has concern for UTI, stating he had some burning and pain with urination in th past week. Symptoms resolved, no complaints today of decreased urine output or pain when urinating. He wife recently  and he reports having some episodes of insomnia and decreased appetite.     Review of Systems     Review of Systems   Constitutional:  Positive for appetite change.   HENT: Negative.     Respiratory:  Positive for shortness of breath. Negative for cough, chest tightness and wheezing.    Cardiovascular:  Negative for chest pain and leg swelling.   Gastrointestinal: Negative.    Genitourinary:  Positive for difficulty urinating, dysuria and urgency. Negative for penile pain, penile swelling, scrotal swelling and testicular pain.   Musculoskeletal:  Positive for arthralgias, back pain and myalgias.   Neurological:  Positive for dizziness and light-headedness.   Psychiatric/Behavioral:  Positive for dysphoric mood and sleep disturbance.       Medical / Social / Family History     Past Medical History:   Diagnosis Date    Arthritis     Diabetes mellitus, type 2     Hypertension      History reviewed. No pertinent surgical history.    Social History  Mr. De Paz  ELENA Albarado  reports that he has never smoked. He has never used smokeless tobacco. He reports that he does not drink alcohol and does not use drugs.    Family History  . Baldev Albarado's family history is not on file.    Medications and Allergies     Medications  Outpatient Medications Marked as Taking for the 4/20/23 encounter (Office Visit) with EL Marie   Medication Sig Dispense Refill    [DISCONTINUED] tamsulosin (FLOMAX) 0.4 mg Cap Take 1 capsule (0.4 mg total) by mouth once daily. 30 capsule 11     Allergies  Review of patient's allergies indicates:  No Known Allergies    Physical Examination     Vitals:    04/20/23 1040   BP: (!) 146/67   Pulse: 78   Temp: 97.6 °F (36.4 °C)     Physical Exam  Vitals and nursing note reviewed.   Constitutional:       Appearance: Normal appearance.   HENT:      Head: Normocephalic and atraumatic.      Nose: Nose normal.      Mouth/Throat:      Mouth: Mucous membranes are moist.      Pharynx: Oropharynx is clear.   Cardiovascular:      Rate and Rhythm: Normal rate and regular rhythm.      Heart sounds: Normal heart sounds.   Pulmonary:      Effort: Pulmonary effort is normal.      Breath sounds: Normal breath sounds.   Abdominal:      General: Bowel sounds are normal.   Musculoskeletal:      Cervical back: Normal range of motion and neck supple.      Comments: Slow, shuffling gait. Arthritis affecting multiple joints per age and like degeneration in joints. No recent falls or accidents. He denies need for assistive device.    Skin:     General: Skin is warm and dry.   Neurological:      Mental Status: He is alert and oriented to person, place, and time.   Psychiatric:         Behavior: Behavior normal.        Assessment and Plan (including Health Maintenance)      Problem List  Smart Sets  Document Outside HM   Plan:   Essential hypertension  -     amLODIPine (NORVASC) 10 MG tablet; Take 1 tablet (10 mg total) by mouth once daily.  Dispense: 90 tablet;  Refill: 1  -     lisinopriL (PRINIVIL,ZESTRIL) 40 MG tablet; Take 1 tablet (40 mg total) by mouth once daily.  Dispense: 90 tablet; Refill: 1  -     Urinalysis, Reflex to Urine Culture; Future; Expected date: 04/20/2023  -     CBC Auto Differential; Future; Expected date: 04/20/2023  -     Comprehensive Metabolic Panel; Future; Expected date: 04/20/2023  -     Microalbumin/Creatinine Ratio, Urine; Future; Expected date: 04/20/2023    Dysuria  -     POCT URINALYSIS W/O SCOPE    Type 2 diabetes mellitus without complication, without long-term current use of insulin  -     Discontinue: glipiZIDE 5 MG TR24; Take 1 tablet (5 mg total) by mouth once daily.  Dispense: 90 tablet; Refill: 1  -     Hemoglobin A1C; Future; Expected date: 04/20/2023  -     Lipid Panel; Future; Expected date: 04/20/2023    Benign prostatic hyperplasia, unspecified whether lower urinary tract symptoms present  -     tamsulosin (FLOMAX) 0.4 mg Cap; Take 1 capsule (0.4 mg total) by mouth once daily.  Dispense: 90 capsule; Refill: 1       Health Maintenance Due   Topic Date Due    Pneumococcal Vaccines (Age 65+) (1 - PCV) Never done    Eye Exam  Never done    COVID-19 Vaccine (4 - Booster for Moderna series) 01/14/2022     Problem List Items Addressed This Visit          Cardiac/Vascular    Essential hypertension - Primary    Relevant Medications    amLODIPine (NORVASC) 10 MG tablet    lisinopriL (PRINIVIL,ZESTRIL) 40 MG tablet    Other Relevant Orders    Urinalysis, Reflex to Urine Culture (Completed)    CBC Auto Differential (Completed)    Comprehensive Metabolic Panel (Completed)    Microalbumin/Creatinine Ratio, Urine (Completed)       Renal/    Dysuria    Relevant Orders    POCT URINALYSIS W/O SCOPE (Completed)    Benign prostatic hyperplasia    Relevant Medications    tamsulosin (FLOMAX) 0.4 mg Cap       Endocrine    Type 2 diabetes mellitus without complication, without long-term current use of insulin    Relevant Orders    Hemoglobin A1C  (Completed)    Lipid Panel (Completed)     Health Maintenance Topics with due status: Not Due       Topic Last Completion Date    Diabetes Urine Screening 04/20/2023    Lipid Panel 04/20/2023    Hemoglobin A1c 04/20/2023     Future Appointments   Date Time Provider Department Center   10/20/2023 10:30 AM EL Marie [facility]        Patient Instructions   Return for next appointment in 6 months, sooner if needed. Take all medications as prescribed. Do not stop or start any new medications without consulting with your provider. If you have access to blood pressure monitor at home, may check blood pressure as needed if symptoms of high or low blood pressure evident.    Follow up in about 6 months (around 10/20/2023).     Signature:  EL Marie      Date of encounter: 4/20/23

## 2023-05-01 ENCOUNTER — TELEPHONE (OUTPATIENT)
Dept: FAMILY MEDICINE | Facility: CLINIC | Age: 83
End: 2023-05-01
Payer: MEDICARE

## 2023-05-01 DIAGNOSIS — E11.9 TYPE 2 DIABETES MELLITUS WITHOUT COMPLICATION, WITHOUT LONG-TERM CURRENT USE OF INSULIN: Primary | ICD-10-CM

## 2023-05-01 RX ORDER — GLIPIZIDE 2.5 MG/1
2.5 TABLET, EXTENDED RELEASE ORAL
Qty: 90 TABLET | Refills: 1 | Status: SHIPPED | OUTPATIENT
Start: 2023-05-01 | End: 2023-09-07

## 2023-05-01 NOTE — TELEPHONE ENCOUNTER
Discussed with pt's caregiver/daughter-in-law  States she would like to decrease Glipizide to 2.5mg due to sugar dropping from time to time.

## 2023-05-01 NOTE — TELEPHONE ENCOUNTER
----- Message from EL Marie sent at 5/1/2023  7:52 AM CDT -----  Please call his daughter/grandvangie and apologize for the delay in calling with his results. Due to computer outages, we have gotten behind and are still trying to contact everyone who had lab work done in the past 7-10 days. His electrolytes such as potassium and sodium are within normal range and his renal filtration/function has improved slightly from 59 to 66 which is good. His hemoglobin A1C is at 5.8. We discussed decreasing his Glipizide from the 5mg to the 2.5mg. I'm not sure the patient understood why but please let his Grandaughter know according to the ADA, Mr. Rivera A1C is good anywhere up tp 7.0. This is the due to higher risk for falls and complications related to low blood sugars in the elderly population. The Glipizide he has, are the extended release tablets and these do not need to be cut or crushed. If she wants to try cutting his dose back to 2.5mg please let me know and I will send him in a new prescription to the pharmacy. His urine was clear and his cholesterol and Triglycerides are at therapeutic range. No other changes need to be made at this time.

## 2023-05-09 DIAGNOSIS — Z71.89 COMPLEX CARE COORDINATION: ICD-10-CM

## 2023-07-21 DIAGNOSIS — F43.29 GRIEF REACTION WITH PROLONGED BEREAVEMENT: Primary | ICD-10-CM

## 2023-07-21 RX ORDER — CITALOPRAM 10 MG/1
10 TABLET ORAL DAILY
Qty: 90 TABLET | Refills: 0 | Status: SHIPPED | OUTPATIENT
Start: 2023-07-21 | End: 2023-09-07 | Stop reason: SDUPTHER

## 2023-08-05 DIAGNOSIS — M19.90 ARTHRITIS: ICD-10-CM

## 2023-08-07 RX ORDER — ALLOPURINOL 300 MG/1
300 TABLET ORAL
Qty: 30 TABLET | Refills: 0 | Status: SHIPPED | OUTPATIENT
Start: 2023-08-07 | End: 2023-09-07 | Stop reason: SDUPTHER

## 2023-09-07 ENCOUNTER — OFFICE VISIT (OUTPATIENT)
Dept: FAMILY MEDICINE | Facility: CLINIC | Age: 83
End: 2023-09-07
Payer: MEDICARE

## 2023-09-07 VITALS
OXYGEN SATURATION: 98 % | SYSTOLIC BLOOD PRESSURE: 130 MMHG | HEIGHT: 69 IN | HEART RATE: 64 BPM | BODY MASS INDEX: 25.03 KG/M2 | DIASTOLIC BLOOD PRESSURE: 72 MMHG | TEMPERATURE: 98 F | WEIGHT: 169 LBS

## 2023-09-07 DIAGNOSIS — N40.0 BENIGN PROSTATIC HYPERPLASIA, UNSPECIFIED WHETHER LOWER URINARY TRACT SYMPTOMS PRESENT: ICD-10-CM

## 2023-09-07 DIAGNOSIS — F43.29 GRIEF REACTION WITH PROLONGED BEREAVEMENT: ICD-10-CM

## 2023-09-07 DIAGNOSIS — E11.9 TYPE 2 DIABETES MELLITUS WITHOUT COMPLICATION, WITHOUT LONG-TERM CURRENT USE OF INSULIN: ICD-10-CM

## 2023-09-07 DIAGNOSIS — I10 ESSENTIAL HYPERTENSION: Primary | ICD-10-CM

## 2023-09-07 DIAGNOSIS — M10.9 GOUT, UNSPECIFIED CAUSE, UNSPECIFIED CHRONICITY, UNSPECIFIED SITE: ICD-10-CM

## 2023-09-07 PROBLEM — R30.0 DYSURIA: Status: RESOLVED | Noted: 2022-10-13 | Resolved: 2023-09-07

## 2023-09-07 PROCEDURE — 99214 PR OFFICE/OUTPT VISIT, EST, LEVL IV, 30-39 MIN: ICD-10-PCS | Mod: ,,, | Performed by: FAMILY MEDICINE

## 2023-09-07 PROCEDURE — 99214 OFFICE O/P EST MOD 30 MIN: CPT | Mod: ,,, | Performed by: FAMILY MEDICINE

## 2023-09-07 RX ORDER — ALLOPURINOL 300 MG/1
300 TABLET ORAL DAILY
Qty: 30 TABLET | Refills: 0 | Status: SHIPPED | OUTPATIENT
Start: 2023-09-07 | End: 2023-10-09 | Stop reason: SDUPTHER

## 2023-09-07 RX ORDER — TAMSULOSIN HYDROCHLORIDE 0.4 MG/1
0.4 CAPSULE ORAL DAILY
Qty: 90 CAPSULE | Refills: 1 | Status: SHIPPED | OUTPATIENT
Start: 2023-09-07 | End: 2024-03-05

## 2023-09-07 RX ORDER — CITALOPRAM 10 MG/1
10 TABLET ORAL DAILY
Qty: 90 TABLET | Refills: 0 | Status: SHIPPED | OUTPATIENT
Start: 2023-09-07 | End: 2023-10-20 | Stop reason: SDUPTHER

## 2023-09-07 RX ORDER — LISINOPRIL 40 MG/1
40 TABLET ORAL DAILY
Qty: 90 TABLET | Refills: 1 | Status: SHIPPED | OUTPATIENT
Start: 2023-09-07 | End: 2024-03-05

## 2023-09-07 NOTE — PROGRESS NOTES
Livia Richey DO        PATIENT NAME: Baldev Albarado  : 1940  DATE: 23  MRN: [id number]      Reason for Visit / Chief Complaint: Medication Refill     History of Present Illness / Problem Focused Workflow     Baldev Albarado presents to the clinic with Medication Refill    Here for medication refills.   Has been taking Lisinopril for HTN. Does have Amlodipine on his medication list but was not aware of this medication   Denies any side effects associated with Lisinopril     Is currently on Glipizide. Daughter reports PCP has been decreasing the dose  Most recent A1c in  was 5.8.   Patient does reports that he occasionally does have hypoglycemic episodes. He is able to tell when these episodes occur and eats something.   Denies any falls or loss of consciousness    Has been Allopurinol for gout for many years and states he has not has a gout flare in over 4 years   Is also taking Tamsulosin for BPH. Reports his urinary symptoms are controlled with this medication    Was also started on Celexa at previous visit to help with mood and sleep. Daughter reports that he his wife of 60 years of marriage, passed away at that time. Patient reports taking this medication in the evening and it has helped with his thoughts and sleep.         Review of Systems     Review of Systems   Constitutional:  Negative for appetite change, chills, fatigue and fever.   Respiratory:  Negative for shortness of breath.    Cardiovascular:  Negative for chest pain.   Gastrointestinal:  Negative for abdominal pain and nausea.   Genitourinary:  Negative for difficulty urinating and dysuria.   Neurological:  Negative for light-headedness and headaches.        Medical / Social / Family History     Past Medical History:   Diagnosis Date    Arthritis     Diabetes mellitus, type 2     Hypertension      No past surgical history on file.    Social History  MrMolly Albarado  reports that he has never smoked. He  has never used smokeless tobacco. He reports that he does not drink alcohol and does not use drugs.    Family History  Mr. Baldev Albarado's family history is not on file.    Medications and Allergies     Medications  Outpatient Medications Marked as Taking for the 9/7/23 encounter (Office Visit) with Livia Richey DO   Medication Sig Dispense Refill    allopurinoL (ZYLOPRIM) 300 MG tablet TAKE ONE TABLET BY MOUTH EVERY DAY 30 tablet 0    citalopram (CELEXA) 10 MG tablet Take 1 tablet (10 mg total) by mouth once daily. 90 tablet 0    glipiZIDE (GLUCOTROL) 2.5 MG TR24 Take 1 tablet (2.5 mg total) by mouth daily with breakfast. 90 tablet 1    lisinopriL (PRINIVIL,ZESTRIL) 40 MG tablet Take 1 tablet (40 mg total) by mouth once daily. 90 tablet 1    tamsulosin (FLOMAX) 0.4 mg Cap Take 1 capsule (0.4 mg total) by mouth once daily. 90 capsule 1     Allergies  Review of patient's allergies indicates:  No Known Allergies    Physical Examination     Vitals:    09/07/23 1000   BP: 130/72   Pulse: 64   Temp: 97.5 °F (36.4 °C)     Physical Exam  Constitutional:       General: He is not in acute distress.     Appearance: Normal appearance.   HENT:      Head: Normocephalic and atraumatic.   Cardiovascular:      Rate and Rhythm: Normal rate and regular rhythm.      Pulses: Normal pulses.      Heart sounds: No murmur heard.  Pulmonary:      Effort: Pulmonary effort is normal.      Breath sounds: Normal breath sounds. No wheezing, rhonchi or rales.   Skin:     General: Skin is warm.   Neurological:      General: No focal deficit present.      Mental Status: He is alert. Mental status is at baseline.      Motor: No weakness.   Psychiatric:         Mood and Affect: Mood normal.         Behavior: Behavior normal.          Assessment and Plan (including Health Maintenance)      Problem List  Smart Sets  Document Outside HM   :  Plan:   Essential hypertension  - Controlled on Lisinopril alone. Continue at current dosage.    -     lisinopriL (PRINIVIL,ZESTRIL) 40 MG tablet; Take 1 tablet (40 mg total) by mouth once daily.  Dispense: 90 tablet; Refill: 1    Type 2 diabetes mellitus without complication, without long-term current use of insulin  - Reviewed A1c levels over the past year being <7.0. Discussed greater risk of hypoglycemia and falls with tight glucose control at this age.  - Stop Glipizide. Follow up with PCP as scheduled and repeat A1c if needed    Grief reaction with prolonged bereavement  - Patient reports some improvement in mood with the medication. Denies any suicidal ideation.  - Continue Celexa at current dosage   -     citalopram (CELEXA) 10 MG tablet; Take 1 tablet (10 mg total) by mouth once daily.  Dispense: 90 tablet; Refill: 0    Gout, unspecified cause, unspecified chronicity, unspecified site  - Continue Allopurinol at current dosage   -     allopurinoL (ZYLOPRIM) 300 MG tablet; Take 1 tablet (300 mg total) by mouth once daily.  Dispense: 30 tablet; Refill: 0    Benign prostatic hyperplasia, unspecified whether lower urinary tract symptoms present  - Symptoms controlled on Tamsulosin. Continue current dosage   -     tamsulosin (FLOMAX) 0.4 mg Cap; Take 1 capsule (0.4 mg total) by mouth once daily.  Dispense: 90 capsule; Refill: 1    Health Maintenance Due   Topic Date Due    Pneumococcal Vaccines (Age 65+) (1 - PCV) Never done    Eye Exam  Never done    COVID-19 Vaccine (4 - Moderna series) 01/14/2022    Influenza Vaccine (1) 09/01/2023     Problem List Items Addressed This Visit    None      Health Maintenance Topics with due status: Not Due       Topic Last Completion Date    Diabetes Urine Screening 04/20/2023    Lipid Panel 04/20/2023    Hemoglobin A1c 04/20/2023       Future Appointments   Date Time Provider Department Center   10/20/2023 10:30 AM Carlos Smith, P [facility]     There are no Patient Instructions on file for this visit.  Follow up in about 1 month (around 10/7/2023),  or As scheduled with PCP, for Labs, HTN, DM.     Signature:  Livia Richey DO      Date of encounter: 9/7/23

## 2023-10-09 DIAGNOSIS — M10.9 GOUT, UNSPECIFIED CAUSE, UNSPECIFIED CHRONICITY, UNSPECIFIED SITE: ICD-10-CM

## 2023-10-09 RX ORDER — ALLOPURINOL 300 MG/1
300 TABLET ORAL DAILY
Qty: 90 TABLET | Refills: 2 | Status: SHIPPED | OUTPATIENT
Start: 2023-10-09

## 2023-12-09 DIAGNOSIS — Z71.89 COMPLEX CARE COORDINATION: ICD-10-CM

## 2024-04-04 ENCOUNTER — TELEPHONE (OUTPATIENT)
Dept: FAMILY MEDICINE | Facility: CLINIC | Age: 84
End: 2024-04-04
Payer: MEDICARE

## 2024-04-04 NOTE — TELEPHONE ENCOUNTER
----- Message from Rosio Castro MD sent at 4/2/2024  3:22 PM CDT -----  Please call the patient with the attached results. Labs obtained in October stable. Diabetes is well controlled. Keep f/u appt with PCP.

## 2024-04-15 ENCOUNTER — OFFICE VISIT (OUTPATIENT)
Dept: FAMILY MEDICINE | Facility: CLINIC | Age: 84
End: 2024-04-15
Payer: MEDICARE

## 2024-04-15 VITALS
BODY MASS INDEX: 24.94 KG/M2 | TEMPERATURE: 97 F | HEIGHT: 69 IN | DIASTOLIC BLOOD PRESSURE: 64 MMHG | SYSTOLIC BLOOD PRESSURE: 138 MMHG | WEIGHT: 168.38 LBS | HEART RATE: 62 BPM

## 2024-04-15 DIAGNOSIS — I10 ESSENTIAL HYPERTENSION: ICD-10-CM

## 2024-04-15 DIAGNOSIS — E11.9 TYPE 2 DIABETES MELLITUS WITHOUT COMPLICATION, WITHOUT LONG-TERM CURRENT USE OF INSULIN: Primary | ICD-10-CM

## 2024-04-15 DIAGNOSIS — Z91.81 AT RISK FOR FALLS: ICD-10-CM

## 2024-04-15 DIAGNOSIS — M10.9 GOUT, UNSPECIFIED CAUSE, UNSPECIFIED CHRONICITY, UNSPECIFIED SITE: ICD-10-CM

## 2024-04-15 DIAGNOSIS — N40.0 BENIGN PROSTATIC HYPERPLASIA, UNSPECIFIED WHETHER LOWER URINARY TRACT SYMPTOMS PRESENT: ICD-10-CM

## 2024-04-15 DIAGNOSIS — F43.29 GRIEF REACTION WITH PROLONGED BEREAVEMENT: ICD-10-CM

## 2024-04-15 LAB
ANION GAP SERPL CALCULATED.3IONS-SCNC: 8 MMOL/L (ref 7–16)
BUN SERPL-MCNC: 25 MG/DL (ref 7–18)
BUN/CREAT SERPL: 22 (ref 6–20)
CALCIUM SERPL-MCNC: 9.7 MG/DL (ref 8.5–10.1)
CHLORIDE SERPL-SCNC: 111 MMOL/L (ref 98–107)
CO2 SERPL-SCNC: 25 MMOL/L (ref 21–32)
CREAT SERPL-MCNC: 1.15 MG/DL (ref 0.7–1.3)
EGFR (NO RACE VARIABLE) (RUSH/TITUS): 62 ML/MIN/1.73M2
GLUCOSE SERPL-MCNC: 150 MG/DL (ref 74–106)
POTASSIUM SERPL-SCNC: 4.4 MMOL/L (ref 3.5–5.1)
SODIUM SERPL-SCNC: 140 MMOL/L (ref 136–145)
URATE SERPL-MCNC: 5.3 MG/DL (ref 3.5–7.2)

## 2024-04-15 PROCEDURE — 84550 ASSAY OF BLOOD/URIC ACID: CPT | Mod: ,,, | Performed by: CLINICAL MEDICAL LABORATORY

## 2024-04-15 PROCEDURE — 80048 BASIC METABOLIC PNL TOTAL CA: CPT | Mod: ,,, | Performed by: CLINICAL MEDICAL LABORATORY

## 2024-04-15 PROCEDURE — G2211 COMPLEX E/M VISIT ADD ON: HCPCS | Mod: ,,, | Performed by: STUDENT IN AN ORGANIZED HEALTH CARE EDUCATION/TRAINING PROGRAM

## 2024-04-15 PROCEDURE — 99214 OFFICE O/P EST MOD 30 MIN: CPT | Mod: ,,, | Performed by: STUDENT IN AN ORGANIZED HEALTH CARE EDUCATION/TRAINING PROGRAM

## 2024-04-15 PROCEDURE — 83036 HEMOGLOBIN GLYCOSYLATED A1C: CPT | Mod: ,,, | Performed by: CLINICAL MEDICAL LABORATORY

## 2024-04-15 RX ORDER — ALLOPURINOL 300 MG/1
300 TABLET ORAL DAILY
Qty: 90 TABLET | Refills: 2 | Status: SHIPPED | OUTPATIENT
Start: 2024-04-15

## 2024-04-15 RX ORDER — LISINOPRIL 40 MG/1
40 TABLET ORAL DAILY
Qty: 90 TABLET | Refills: 1 | Status: SHIPPED | OUTPATIENT
Start: 2024-04-15 | End: 2024-10-12

## 2024-04-15 RX ORDER — TAMSULOSIN HYDROCHLORIDE 0.4 MG/1
0.4 CAPSULE ORAL DAILY
Qty: 90 CAPSULE | Refills: 1 | Status: SHIPPED | OUTPATIENT
Start: 2024-04-15 | End: 2024-10-12

## 2024-04-15 RX ORDER — CITALOPRAM 10 MG/1
10 TABLET ORAL DAILY
Qty: 90 TABLET | Refills: 0 | Status: SHIPPED | OUTPATIENT
Start: 2024-04-15 | End: 2024-07-14

## 2024-04-15 NOTE — PROGRESS NOTES
"Subjective     Patient ID: Baldev Albarado is a 83 y.o. male.    Chief Complaint: Hypertension, Gout, Benign Prostatic Hypertrophy, and Sleeping Problem (Needs all meds refilled; has no complaints today)    HPI    Baldev Albarado is a 83 y.o. patient with the medical problems listed below who comes to clinic with his daughter-in-law to establish care with me and discuss his chronic medical conditions.    He lives alone, but he has family that visits him twice daily to assist with iADLs. He is mostly independent in all ADLs.    Does have some forgetfulness.    Has a slow gait.    Reports nocturia approximately 2 times per night, but good flow when he does urinate.    Has not had a gout flare in approx 4-5 years.Previous uric acid well-controlled.     SSRI has been helping mood and sleep. No adverse effects noted.     He is edentulous, and has been for many years. He looks well-nourished.He does not see a dentist regularly.      Objective   /64   Pulse 62   Temp 97.4 °F (36.3 °C)   Ht 5' 9" (1.753 m)   Wt 76.4 kg (168 lb 6.4 oz)   BMI 24.87 kg/m²     Physical Exam  Gen: NAD, well-groomed, well-dressed   HEENT: oropharynx benign; no conjunctival pallor or scleral icterus; mucous membranes moist  CV: normal rate, regular rhythm; no m/r/g appreciated  Pulm: CTAB; normal work of breathing  Abdomen: NABS, soft, non tender/non-distended;  Extremities: warm, well-perfused; no peripheral edema  MSK: normal bulk and tone   Skin: warm and dry; no suspicious lesions  Neuro: CN II-XII grossly normal; no focal deficits; awake and alert; gait normal  Psych: pleasant affect; judgment/insight intact       Assessment and Plan     Problem List Items Addressed This Visit       Essential hypertension    Relevant Medications    lisinopriL (PRINIVIL,ZESTRIL) 40 MG tablet    Other Relevant Orders    Basic Metabolic Panel    Benign prostatic hyperplasia    Relevant Medications    tamsulosin (FLOMAX) 0.4 mg Cap    Type 2 " diabetes mellitus without complication, without long-term current use of insulin - Primary    Relevant Orders    Hemoglobin A1C    Gout    Relevant Medications    allopurinoL (ZYLOPRIM) 300 MG tablet    Other Relevant Orders    Uric Acid    Grief reaction with prolonged bereavement    Relevant Medications    citalopram (CELEXA) 10 MG tablet     Other Visit Diagnoses       At risk for falls        Relevant Orders    GRAB BAR FOR HOME USE            HTN - reasonably well-controlled; continue current medical regimen. DBP a little low; monitor.   BPH - continue current medical regimen for now.   DM  - Historical diagnosis - A1c seems to have been very well-controlled with diet only recently.  Gout - The current medical regimen is effective;  continue present plan and medications. Check uric acid  Depression - celexa is helping with mood and sleep. Would favor continuing this for now.   Falls risk - no recent falls but is at risk given his slow and abnormal gait. His daughter in law does the driving so he is homebound. May discuss starting home health for him with home safety eval.      Face to face encounter time 15 minutes.    Non face to face encounter time 15 minutes.  Reviewing separate obtained history, counseling and educating the patient, family and/or other caregivers, ordering medications, tests or procedures; referring and communicating with other health care professionals; documenting clinical information in the electronic medical record; care coordination; independently interpreting results and communicating results to the patient, family, and/or caregivers.    Total time for visit  30 minutes

## 2024-04-16 LAB
EST. AVERAGE GLUCOSE BLD GHB EST-MCNC: 143 MG/DL
HBA1C MFR BLD HPLC: 6.6 % (ref 4.5–6.6)

## 2024-04-30 ENCOUNTER — TELEPHONE (OUTPATIENT)
Dept: FAMILY MEDICINE | Facility: CLINIC | Age: 84
End: 2024-04-30
Payer: MEDICARE

## 2024-04-30 NOTE — TELEPHONE ENCOUNTER
----- Message from Isidro Rosenthal MD sent at 4/29/2024  2:35 PM CDT -----  Please contact the patient's family and let them know that their results were fine, but the labs do indicate he is  a little dehydrated. Please encourage him to stay adequately nourished and hydrated.

## 2024-04-30 NOTE — TELEPHONE ENCOUNTER
Discussed with pt's daughter; states he drinks only water and 1 glass of unsweetened tea at dinner; will encourage him to drink more.

## 2024-07-09 DIAGNOSIS — Z71.89 COMPLEX CARE COORDINATION: ICD-10-CM

## 2024-09-03 ENCOUNTER — OFFICE VISIT (OUTPATIENT)
Dept: FAMILY MEDICINE | Facility: CLINIC | Age: 84
End: 2024-09-03
Payer: MEDICARE

## 2024-09-03 VITALS
TEMPERATURE: 98 F | SYSTOLIC BLOOD PRESSURE: 114 MMHG | OXYGEN SATURATION: 96 % | HEART RATE: 73 BPM | HEIGHT: 69 IN | BODY MASS INDEX: 25.23 KG/M2 | WEIGHT: 170.38 LBS | DIASTOLIC BLOOD PRESSURE: 58 MMHG

## 2024-09-03 DIAGNOSIS — N40.0 BENIGN PROSTATIC HYPERPLASIA, UNSPECIFIED WHETHER LOWER URINARY TRACT SYMPTOMS PRESENT: ICD-10-CM

## 2024-09-03 DIAGNOSIS — E11.9 TYPE 2 DIABETES MELLITUS WITHOUT COMPLICATION, WITHOUT LONG-TERM CURRENT USE OF INSULIN: ICD-10-CM

## 2024-09-03 DIAGNOSIS — M10.9 GOUT, UNSPECIFIED CAUSE, UNSPECIFIED CHRONICITY, UNSPECIFIED SITE: ICD-10-CM

## 2024-09-03 DIAGNOSIS — R42 DIZZY SPELLS: ICD-10-CM

## 2024-09-03 DIAGNOSIS — F43.29 GRIEF REACTION WITH PROLONGED BEREAVEMENT: ICD-10-CM

## 2024-09-03 DIAGNOSIS — I10 ESSENTIAL HYPERTENSION: ICD-10-CM

## 2024-09-03 DIAGNOSIS — R01.1 SYSTOLIC MURMUR: Primary | ICD-10-CM

## 2024-09-03 LAB
ALBUMIN SERPL BCP-MCNC: 3.3 G/DL (ref 3.5–5)
ALBUMIN/GLOB SERPL: 0.9 {RATIO}
ALP SERPL-CCNC: 82 U/L (ref 45–115)
ALT SERPL W P-5'-P-CCNC: 34 U/L (ref 16–61)
ANION GAP SERPL CALCULATED.3IONS-SCNC: 11 MMOL/L (ref 7–16)
AST SERPL W P-5'-P-CCNC: 29 U/L (ref 15–37)
BILIRUB SERPL-MCNC: 0.3 MG/DL (ref ?–1.2)
BUN SERPL-MCNC: 30 MG/DL (ref 7–18)
BUN/CREAT SERPL: 26 (ref 6–20)
CALCIUM SERPL-MCNC: 8.9 MG/DL (ref 8.5–10.1)
CHLORIDE SERPL-SCNC: 114 MMOL/L (ref 98–107)
CO2 SERPL-SCNC: 21 MMOL/L (ref 21–32)
CREAT SERPL-MCNC: 1.17 MG/DL (ref 0.7–1.3)
EGFR (NO RACE VARIABLE) (RUSH/TITUS): 61 ML/MIN/1.73M2
GLOBULIN SER-MCNC: 3.6 G/DL (ref 2–4)
GLUCOSE SERPL-MCNC: 155 MG/DL (ref 74–106)
POTASSIUM SERPL-SCNC: 4.5 MMOL/L (ref 3.5–5.1)
PROT SERPL-MCNC: 6.9 G/DL (ref 6.4–8.2)
SODIUM SERPL-SCNC: 141 MMOL/L (ref 136–145)

## 2024-09-03 PROCEDURE — 83036 HEMOGLOBIN GLYCOSYLATED A1C: CPT | Mod: ,,, | Performed by: CLINICAL MEDICAL LABORATORY

## 2024-09-03 PROCEDURE — 80053 COMPREHEN METABOLIC PANEL: CPT | Mod: ,,, | Performed by: CLINICAL MEDICAL LABORATORY

## 2024-09-03 PROCEDURE — 99213 OFFICE O/P EST LOW 20 MIN: CPT | Mod: ,,, | Performed by: STUDENT IN AN ORGANIZED HEALTH CARE EDUCATION/TRAINING PROGRAM

## 2024-09-03 PROCEDURE — G2211 COMPLEX E/M VISIT ADD ON: HCPCS | Mod: ,,, | Performed by: STUDENT IN AN ORGANIZED HEALTH CARE EDUCATION/TRAINING PROGRAM

## 2024-09-03 RX ORDER — LISINOPRIL 20 MG/1
20 TABLET ORAL DAILY
Qty: 90 TABLET | Refills: 1 | Status: SHIPPED | OUTPATIENT
Start: 2024-09-03 | End: 2025-03-02

## 2024-09-03 RX ORDER — ALLOPURINOL 300 MG/1
300 TABLET ORAL DAILY
Qty: 90 TABLET | Refills: 2 | Status: SHIPPED | OUTPATIENT
Start: 2024-09-03

## 2024-09-03 RX ORDER — CITALOPRAM 10 MG/1
10 TABLET ORAL DAILY
Qty: 90 TABLET | Refills: 0 | Status: SHIPPED | OUTPATIENT
Start: 2024-09-03 | End: 2024-12-02

## 2024-09-03 RX ORDER — LISINOPRIL 40 MG/1
40 TABLET ORAL DAILY
Qty: 90 TABLET | Refills: 1 | Status: SHIPPED | OUTPATIENT
Start: 2024-09-03 | End: 2024-09-03 | Stop reason: SDUPTHER

## 2024-09-03 RX ORDER — TAMSULOSIN HYDROCHLORIDE 0.4 MG/1
0.4 CAPSULE ORAL DAILY
Qty: 90 CAPSULE | Refills: 1 | Status: SHIPPED | OUTPATIENT
Start: 2024-09-03 | End: 2025-03-02

## 2024-09-03 NOTE — PROGRESS NOTES
"Subjective     Patient ID: Baldev Albarado is a 84 y.o. male.    Chief Complaint: Follow-up (4 month) and Dizziness (Started about a month ago and has become more frequent in the past week)    HPI    Baldev Albarado is a 84 y.o. patient with the medical problems listed below who comes to clinic for refill of their medication and follow-up for their chronic medical conditions. Here with his daughter today.    Dizzy spells; BP a little too low; but also has systolic murmur.        Objective   BP (!) 114/58   Pulse 73   Temp 98 °F (36.7 °C)   Ht 5' 9" (1.753 m)   Wt 77.3 kg (170 lb 6.4 oz)   SpO2 96%   BMI 25.16 kg/m²     Wt Readings from Last 3 Encounters:   09/03/24 1021 77.3 kg (170 lb 6.4 oz)   04/15/24 0830 76.4 kg (168 lb 6.4 oz)   10/20/23 1102 76.9 kg (169 lb 9.6 oz)         Physical Exam  Gen: NAD, well-groomed, well-dressed   HEENT: oropharynx benign; no conjunctival pallor or scleral icterus; mucous membranes moist  CV: normal rate, regular rhythm; soft systolic murmur  Pulm: CTAB; normal work of breathing  Abdomen: NABS, soft, non tender/non-distended; no hepatosplenomegaly appreciated  Extremities: warm, well-perfused; no peripheral edema  MSK: normal bulk and tone   Skin: warm and dry; no suspicious lesions  Neuro: CN II-XII grossly normal; no focal deficits; awake and alert; gait normal  Psych: pleasant affect; judgment/insight intact         Assessment and Plan     Problem List Items Addressed This Visit       Essential hypertension    Relevant Medications    lisinopriL (PRINIVIL,ZESTRIL) 20 MG tablet    Other Relevant Orders    Comprehensive Metabolic Panel (Completed)    Benign prostatic hyperplasia    Relevant Medications    tamsulosin (FLOMAX) 0.4 mg Cap    Type 2 diabetes mellitus without complication, without long-term current use of insulin    Relevant Orders    Hemoglobin A1C (Completed)    Gout    Relevant Medications    allopurinoL (ZYLOPRIM) 300 MG tablet    Grief reaction with " prolonged bereavement    Relevant Medications    citalopram (CELEXA) 10 MG tablet     Other Visit Diagnoses       Systolic murmur    -  Primary    Relevant Orders    Ambulatory referral/consult to Cardiology    Dizzy spells        Relevant Orders    Ambulatory referral/consult to Cardiology    CBC Auto Differential            Dizzy spells - episodic and brief. no LOC or falls; no transient/persist neuro symptoms. His BP is somewhat low but he is not on any diuretics. Taking lisinopril 20 mg daily and also takes flomax --may need to discontinue the ACEi and see if these persist. Will refer him to cards for TTE - he has a systolic murmur so want to see if  there are any valvular abnormalities that could be contributing to his presentation.   HTN - BP a little low today, but he is asymptomatic currently; for now, continue current medical regimen, but I think we may need to decrease or discontinue his lisinopril if he continues to have these dizzy spells  BPH - The current medical regimen is effective;  continue present plan and medications.  DM - check A1c today. Diet-controlled currently  Depression - mood is good and has been stable; continue SSRI for now.  Gout - no recent flares; continue allopurinol at current dose.

## 2024-09-04 LAB
EST. AVERAGE GLUCOSE BLD GHB EST-MCNC: 148 MG/DL
HBA1C MFR BLD HPLC: 6.8 % (ref 4.5–6.6)

## 2024-09-16 ENCOUNTER — TELEPHONE (OUTPATIENT)
Dept: FAMILY MEDICINE | Facility: CLINIC | Age: 84
End: 2024-09-16
Payer: MEDICARE

## 2024-09-16 NOTE — TELEPHONE ENCOUNTER
----- Message from Isidro Rosenthal MD sent at 9/15/2024  5:16 PM CDT -----  Please contact the patient and let them know that their results were fine and do not require any change in treatment.

## 2024-10-15 ENCOUNTER — OFFICE VISIT (OUTPATIENT)
Dept: FAMILY MEDICINE | Facility: CLINIC | Age: 84
End: 2024-10-15
Payer: MEDICARE

## 2024-10-15 VITALS
SYSTOLIC BLOOD PRESSURE: 119 MMHG | HEIGHT: 69 IN | WEIGHT: 169.81 LBS | HEART RATE: 73 BPM | OXYGEN SATURATION: 96 % | BODY MASS INDEX: 25.15 KG/M2 | TEMPERATURE: 98 F | DIASTOLIC BLOOD PRESSURE: 54 MMHG

## 2024-10-15 DIAGNOSIS — R42 DIZZY SPELLS: ICD-10-CM

## 2024-10-15 DIAGNOSIS — Z23 NEED FOR VACCINATION: Primary | ICD-10-CM

## 2024-10-15 PROCEDURE — 90653 IIV ADJUVANT VACCINE IM: CPT | Mod: ,,, | Performed by: STUDENT IN AN ORGANIZED HEALTH CARE EDUCATION/TRAINING PROGRAM

## 2024-10-15 PROCEDURE — 99213 OFFICE O/P EST LOW 20 MIN: CPT | Mod: ,,, | Performed by: STUDENT IN AN ORGANIZED HEALTH CARE EDUCATION/TRAINING PROGRAM

## 2024-10-15 PROCEDURE — G0008 ADMIN INFLUENZA VIRUS VAC: HCPCS | Mod: ,,, | Performed by: STUDENT IN AN ORGANIZED HEALTH CARE EDUCATION/TRAINING PROGRAM

## 2024-10-15 NOTE — PROGRESS NOTES
"Subjective     Patient ID: Baldev Albarado is a 84 y.o. male.    Chief Complaint: Hypertension and Follow-up    HPI    Baldev Albarado is a 84 y.o. patient with the medical problems listed below who comes to clinic for follow-up. He has been feeling well with few concerns.     At his last visit about 6 weeks ago,    Dizzy spells - episodic and brief. no LOC or falls; no transient/persist neuro symptoms. His BP is somewhat low but he is not on any diuretics. Taking lisinopril 20 mg daily and also takes flomax --may need to discontinue the ACEi and see if these persist. Will refer him to cards for TTE - he has a systolic murmur so want to see if  there are any valvular abnormalities that could be contributing to his presentation.   HTN - BP a little low today, but he is asymptomatic currently; for now, continue current medical regimen, but I think we may need to decrease or discontinue his lisinopril if he continues to have these dizzy spells  BPH - The current medical regimen is effective;  continue present plan and medications.  DM - check A1c today. Diet-controlled currently  Depression - mood is good and has been stable; continue SSRI for now.  Gout - no recent flares; continue allopurinol at current dose.        Objective   BP (!) 119/54   Pulse 73   Temp 97.8 °F (36.6 °C)   Ht 5' 9" (1.753 m)   Wt 77 kg (169 lb 12.8 oz)   SpO2 96%   BMI 25.08 kg/m²     Physical Exam  Gen: well-groomed, well-dressed. No apparent distress  HEENT:  NCAT, symmetric  Pulm: normal work of breathing, no accessory muscle use; speaking complete sentences without having to stop  Neuro: CN II-XII grossly normal   Psych: pleasant affect, congruent mood. Linear thought; good eye contact  SKIN: no rashes present on face, neck, hands       Assessment and Plan     Problem List Items Addressed This Visit    None  Visit Diagnoses       Need for vaccination    -  Primary    Relevant Medications    influenza (adjuvanted) (Fluad) 45 " mcg/0.5 mL IM vaccine (> or = 64 yo) 0.5 mL (Completed)    Dizzy spells              HTN - still a little low but denies any more problems with dizziness. Cards ordered stress test but I do not have the results of that. TTE seemed ok; reviewed this. Spoke with daughter over thep va

## 2024-12-23 ENCOUNTER — OFFICE VISIT (OUTPATIENT)
Dept: FAMILY MEDICINE | Facility: CLINIC | Age: 84
End: 2024-12-23
Payer: MEDICARE

## 2024-12-23 VITALS
RESPIRATION RATE: 16 BRPM | BODY MASS INDEX: 24.14 KG/M2 | TEMPERATURE: 97 F | OXYGEN SATURATION: 97 % | HEIGHT: 69 IN | WEIGHT: 163 LBS | HEART RATE: 71 BPM | SYSTOLIC BLOOD PRESSURE: 118 MMHG | DIASTOLIC BLOOD PRESSURE: 56 MMHG

## 2024-12-23 DIAGNOSIS — N40.0 BENIGN PROSTATIC HYPERPLASIA, UNSPECIFIED WHETHER LOWER URINARY TRACT SYMPTOMS PRESENT: ICD-10-CM

## 2024-12-23 DIAGNOSIS — I10 ESSENTIAL HYPERTENSION: ICD-10-CM

## 2024-12-23 DIAGNOSIS — F43.29 GRIEF REACTION WITH PROLONGED BEREAVEMENT: ICD-10-CM

## 2024-12-23 DIAGNOSIS — Z91.81 AT RISK FOR FALLS: ICD-10-CM

## 2024-12-23 DIAGNOSIS — R01.1 SYSTOLIC MURMUR: ICD-10-CM

## 2024-12-23 DIAGNOSIS — E11.9 TYPE 2 DIABETES MELLITUS WITHOUT COMPLICATION, WITHOUT LONG-TERM CURRENT USE OF INSULIN: ICD-10-CM

## 2024-12-23 DIAGNOSIS — Z00.00 ENCOUNTER FOR INITIAL ANNUAL WELLNESS VISIT (AWV) IN MEDICARE PATIENT: Primary | ICD-10-CM

## 2024-12-23 DIAGNOSIS — Z13.31 POSITIVE DEPRESSION SCREENING: ICD-10-CM

## 2024-12-23 DIAGNOSIS — M19.90 ARTHRITIS: ICD-10-CM

## 2024-12-23 DIAGNOSIS — M10.9 GOUT, UNSPECIFIED CAUSE, UNSPECIFIED CHRONICITY, UNSPECIFIED SITE: ICD-10-CM

## 2024-12-23 LAB
CREAT UR-MCNC: 74 MG/DL (ref 23–375)
MICROALBUMIN UR-MCNC: 6.3 MG/DL
MICROALBUMIN/CREAT RATIO PNL UR: 85.1 MG/G (ref 0–30)

## 2024-12-23 PROCEDURE — 82043 UR ALBUMIN QUANTITATIVE: CPT | Mod: ,,, | Performed by: CLINICAL MEDICAL LABORATORY

## 2024-12-23 PROCEDURE — G0438 PPPS, INITIAL VISIT: HCPCS | Mod: ,,, | Performed by: FAMILY MEDICINE

## 2024-12-23 PROCEDURE — 82570 ASSAY OF URINE CREATININE: CPT | Mod: ,,, | Performed by: CLINICAL MEDICAL LABORATORY

## 2024-12-23 RX ORDER — ATORVASTATIN CALCIUM 40 MG/1
40 TABLET, FILM COATED ORAL NIGHTLY
COMMUNITY
Start: 2024-11-20

## 2024-12-23 RX ORDER — TICAGRELOR 90 MG/1
90 TABLET ORAL 2 TIMES DAILY
COMMUNITY

## 2024-12-23 RX ORDER — NAPROXEN SODIUM 220 MG/1
81 TABLET, FILM COATED ORAL DAILY
COMMUNITY

## 2024-12-23 NOTE — PROGRESS NOTES
"  Baldev Albarado presented for a  Medicare AWV and comprehensive Health Risk Assessment today. The following components were reviewed and updated:    Medical history  Family History  Social history  Allergies and Current Medications  Health Risk Assessment  Health Maintenance  Care Team         ** See Completed Assessments for Annual Wellness Visit within the encounter summary.**         The following assessments were completed:  Living Situation  CAGE  Depression Screening  Timed Get Up and Go  Whisper Test  Cognitive Function Screening  Nutrition Screening  ADL Screening  PAQ Screening        Opioid Documentation    does not have a current opioid prescription.       Vitals:    12/23/24 1038   BP: (!) 118/56   BP Location: Left arm   Patient Position: Sitting   Pulse: 71   Resp: 16   Temp: 97.2 °F (36.2 °C)   SpO2: 97%   Weight: 73.9 kg (163 lb)   Height: 5' 9" (1.753 m)     Body mass index is 24.07 kg/m².  Physical Exam  Constitutional:       General: He is not in acute distress.     Appearance: Normal appearance.   HENT:      Head: Normocephalic and atraumatic.   Cardiovascular:      Rate and Rhythm: Normal rate and regular rhythm.      Pulses: Normal pulses.      Heart sounds: Murmur heard.   Pulmonary:      Effort: Pulmonary effort is normal.      Breath sounds: Normal breath sounds. No wheezing, rhonchi or rales.   Neurological:      General: No focal deficit present.      Mental Status: He is alert. Mental status is at baseline.           Diagnoses and health risks identified today and associated recommendations/orders:    Problem List Items Addressed This Visit          Cardiac/Vascular    Essential hypertension       Renal/    Benign prostatic hyperplasia       Endocrine    Type 2 diabetes mellitus without complication, without long-term current use of insulin    Relevant Orders    Microalbumin/Creatinine Ratio, Urine       Orthopedic    Arthritis    Gout       Other    Grief reaction with prolonged " 12/7/2021    Augusto Meyer, DO  [address] [place] [address]    RE: Gillian Burk       Dear Colleague,     I had the pleasure of seeing Gillian Burk in the [facility].  HPI and Plan:   See dictation    Today's clinic visit entailed:  Review of the result(s) of each unique test - bmp, flp, hgb, iron studies, echo, angiogram  Ordering of each unique test  Prescription drug management  30 minutes spent on the date of the encounter doing chart review, review of test results, interpretation of tests, patient visit and documentation   Provider  Link to [facility] Help Grid     The level of medical decision making during this visit was of moderate complexity.      Orders Placed This Encounter   Procedures     Basic metabolic panel     Basic metabolic panel     Lipid Profile     ALT     Follow-Up with Cardiac Advanced Practice Provider     Follow-Up with Cardiologist     Echocardiogram Complete       Orders Placed This Encounter   Medications     isosorbide mononitrate (IMDUR) 60 MG 24 hr tablet     Sig: Take 1 tablet (60 mg) by mouth daily     Dispense:  180 tablet     Refill:  3     lisinopril (ZESTRIL) 5 MG tablet     Sig: Take 1 tablet (5 mg) by mouth daily     Dispense:  90 tablet     Refill:  3       Medications Discontinued During This Encounter   Medication Reason     multivitamin w/minerals (THERA-VIT-M) tablet      folic acid (FOLVITE) 1 MG tablet      fluticasone (FLONASE) 50 MCG/ACT nasal spray      lisinopril (ZESTRIL) 2.5 MG tablet      isosorbide mononitrate (IMDUR) 60 MG 24 hr tablet          Encounter Diagnoses   Name Primary?     S/P TAVR (transcatheter aortic valve replacement)      Coronary artery disease involving native coronary artery of native heart without angina pectoris      Hypertension goal BP (blood pressure) < 140/90      History of CVA (cerebrovascular accident) - 2013      Hyperlipidemia with target LDL less than 130      Unstable angina (H)       CONTRERAS (dyspnea on exertion)        CURRENT MEDICATIONS:  Current Outpatient Medications   Medication Sig Dispense Refill     acetaminophen (TYLENOL ARTHRITIS PAIN) 650 MG CR tablet Take 1,300 mg by mouth every 8 hours as needed for mild pain        amLODIPine (NORVASC) 5 MG tablet Take 1 tablet (5 mg) by mouth daily 90 tablet 0     aspirin EC 81 MG EC tablet Take 1 tablet (81 mg) by mouth daily       atorvastatin (LIPITOR) 40 MG tablet Take 1 tablet (40 mg) by mouth daily 90 tablet 0     calcium carbonate (TUMS) 500 MG chewable tablet Take 2-4 tablets (1,000-2,000 mg) by mouth as needed for heartburn       clopidogrel (PLAVIX) 75 MG tablet Take 1 tablet (75 mg) by mouth daily 90 tablet 0     Cranberry 1000 MG CAPS        docusate sodium (COLACE) 100 MG capsule Take 200 mg by mouth daily 2 capsules       EQ LORATADINE 10 MG tablet Take 1 tablet by mouth once daily 30 tablet 0     EUTHYROX 75 MCG tablet Take 1 tablet by mouth once daily 90 tablet 0     famotidine (PEPCID) 40 MG tablet TAKE 1 TABLET BY MOUTH AT BEDTIME 30 tablet 9     isosorbide mononitrate (IMDUR) 60 MG 24 hr tablet Take 1 tablet (60 mg) by mouth daily 180 tablet 3     Lactobacillus (FLORAJEN ACIDOPHILUS) CAPS Take 1 capsule by mouth daily       levETIRAcetam (KEPPRA) 500 MG tablet Take 1 tablet (500 mg) by mouth 2 times daily 270 tablet 1     lisinopril (ZESTRIL) 5 MG tablet Take 1 tablet (5 mg) by mouth daily 90 tablet 3     methotrexate 2.5 MG tablet Take 4 tablets (10 mg) by mouth once a week Wed 52 tablet 3     metoprolol succinate ER (TOPROL-XL) 50 MG 24 hr tablet Take 1 tablet (50 mg) by mouth 2 times daily 180 tablet 0     mirtazapine (REMERON) 30 MG tablet TAKE 1 TABLET BY MOUTH ONCE DAILY AT BEDTIME 90 tablet 1     nitroFURantoin macrocrystal (MACRODANTIN) 100 MG capsule Take 1 capsule (100 mg) by mouth At Bedtime 90 capsule 3     nitroFURantoin macrocrystal-monohydrate (MACROBID) 100 MG capsule Take 100 mg by mouth daily Takes for  bereavement     Other Visit Diagnoses       Encounter for initial annual wellness visit (AWV) in Medicare patient    -  Primary    Systolic murmur        At risk for falls        BMI 24.0-24.9, adult        Positive depression screening        I have reviewed the positive depression score with the patient and found that no additional intervention is needed at this time.          Provided Baldev with a 5-10 year written screening schedule and personal prevention plan. Recommendations were developed using the USPSTF age appropriate recommendations. Education, counseling, and referrals were provided as needed. After Visit Summary printed and given to patient which includes a list of additional screenings\tests needed.    Patient declines vaccines today will follow up with pharmacy at later date if desired.     Microalbumin ordered today     [facility] exam scheduled for 2025 - @ 8:30am [facility]    Did have recent cardiac cath with stent placement. Currently on ASA, Brilinta and Lipitor.   Reports he is feeling well since the cath. Daughter report he has follow up appointment in Feb with Cardiology     [employer] Follow up with PCP for further evaluation     Follow up with PCP in 1-2 weeks.     Follow up for 1 year annual wellness.    Livia Richey, DO      I offered to discuss advanced care planning, including how to pick a person who would make decisions for you if you were unable to make them for yourself, called a health care power of , and what kind of decisions you might make such as use of life sustaining treatments such as ventilators and tube feeding when faced with a life limiting illness recorded on a living will that they will need to know. (How you want to be cared for as you near the end of your natural life)     X Patient is interested in learning more about how to make advanced directives.  I provided them paperwork and offered to discuss this with them.   prevention of UTI       nitroGLYcerin (NITROSTAT) 0.4 MG sublingual tablet DISSOLVE ONE TABLET UNDER THE TONGUE EVERY 5 MINUTES AS NEEDED FOR CHEST PAIN.  DO NOT EXCEED A TOTAL OF 3 DOSES IN 15 MINUTES 25 tablet 0     predniSONE (DELTASONE) 5 MG tablet TAKE ONE TABLET BY MOUTH EVERY OTHER DAY ALTERNATING  WITH  1/2  TABLET  EVERY  OTHER  DAY 23 tablet 13     RESTASIS 0.05 % ophthalmic emulsion INSTILL 1 DROP INTO LEFT EYE TWICE DAILY AS DIRECTED       SLOW  (45 Fe) MG CR tablet Take 1 tablet (142 mg) by mouth daily 90 tablet 1     ALPRAZolam (XANAX) 0.25 MG tablet Take 1 tablet by mouth twice daily as needed for anxiety (Patient not taking: Reported on 11/17/2021) 28 tablet 0       ALLERGIES     Allergies   Allergen Reactions     Penicillins Hives     Caffeine Other (See Comments) and Unknown     Triggers your Meniere's disease     Hydrocodone Other (See Comments) and Unknown     hallucinations     Ibuprofen GI Disturbance and Unknown     Other Environmental Allergy      Metals of unspecific kind     Tramadol Other (See Comments)     Seizure, was taking remeron along with tramadol     Metal [Staples] Rash       PAST MEDICAL HISTORY:  Past Medical History:   Diagnosis Date     Aortic stenosis     s/p TAVR 8/17/16     Chronic migraine      Hyperlipidaemia      Hypertension      Hypothyroidism      Myocardial infarction (H)      Need for SBE (subacute bacterial endocarditis) prophylaxis      Orthostatic hypotension     wears compression stockings     PUD (peptic ulcer disease)      Secondary Sjogren's syndrome (H)      Seizures (H)     after stroke (partial onset)     Seropositive rheumatoid arthritis (H)      Stroke (H) 05/2013    with visual field cut, left occipital lobe     Syncope 2014    due to orthostatic hypotension       PAST SURGICAL HISTORY:  Past Surgical History:   Procedure Laterality Date     ANOMALOUS PULMONARY VENOUS RETURN REPAIR, TOTAL       C TOTAL HIP ARTHROPLASTY Right 2010     C TOTAL HIP  ARTHROPLASTY Left 2014     CATARACT EXTRACTION       CATARACT IOL, RT/LT Bilateral 2000     CV FLUOROSCOPY ONLY N/A 8/6/2019    Procedure: Fluoroscopy Only - valve cine done of aortic valve at 180 degrees ARIANA to JUNG;  Surgeon: Dave Farfan MD;  Location:  [facility]     CV HEART CATHETERIZATION WITH POSSIBLE INTERVENTION N/A 2/10/2021    Procedure: Heart Catheterization with Possible Intervention;  Surgeon: Fish Padgett MD;  Location:  [facility]ATH LAB     EYE SURGERY  1999    macular pucker eye surgery     EYE SURGERY       HEART CATH FEMORAL CANNULIZATION WITH OPEN STANDBY REPAIR AORTIC VALVE N/A 8/3/2016    Procedure: HEART CATH FEMORAL CANNULIZATION WITH OPEN STANDBY REPAIR AORTIC VALVE;  Surgeon: Owen Schultz MD;  Location: [facility] OR     HYSTERECTOMY       HYSTERECTOMY TOTAL ABDOMINAL  1970    ovaries out     MANDIBLE FRACTURE SURGERY       MOUTH SURGERY  2011    jaw surgery due to fracture     OTHER SURGICAL HISTORY      Treva valve implant     TOTAL HIP ARTHROPLASTY       TRANSCATHETER AORTIC VALVE IMPLANT ANESTHESIA N/A 8/3/2016    Procedure: TRANSCATHETER AORTIC VALVE IMPLANT ANESTHESIA;  Surgeon: GENERIC ANESTHESIA PROVIDER;  Location:  OR       FAMILY HISTORY:  Family History   Problem Relation Age of Onset     Hyperlipidemia Mother      Family History Negative No family hx of      Hypertension Mother      Rheumatoid Arthritis Child        SOCIAL HISTORY:  Social History     Socioeconomic History     Marital status:      Spouse name: Not on file     Number of children: 4     Years of education: Not on file     Highest education level: Not on file   Occupational History     Employer: RETIRED   Tobacco Use     Smoking status: Never Smoker     Smokeless tobacco: Never Used   Substance and Sexual Activity     Alcohol use: No     Alcohol/week: 0.0 standard drinks     Drug use: No     Sexual activity: Not Currently   Other Topics Concern      "Parent/sibling w/ CABG, MI or angioplasty before 65F 55M? Not Asked      Service Not Asked     Blood Transfusions Not Asked     Caffeine Concern Not Asked     Occupational Exposure Not Asked     Hobby Hazards Not Asked     Sleep Concern Not Asked     Stress Concern Not Asked     Weight Concern Not Asked     Special Diet Yes     Comment: low salt      Back Care Not Asked     Exercise Yes     Comment: semi recument bike 15 mins daily      Bike Helmet Not Asked     Seat Belt Not Asked     Self-Exams Not Asked   Social History Narrative    .  Lives in assisted living. Independent. Has help with making bed and changing sheets.    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 .     4 children - 1 with RA     Social Determinants of Health     Financial Resource Strain: Not on file   Food Insecurity: Not on file   Transportation Needs: Not on file   Physical Activity: Not on file   Stress: Not on file   Social Connections: Not on file   Intimate Partner Violence: Not on file   Housing Stability: Not on file       Review of Systems:  Skin:  Negative       Eyes:  Positive for glasses    ENT:  Positive for hearing loss    Respiratory:  Positive for dyspnea on exertion since infusion breathing has been a little better   Cardiovascular:  Negative for;palpitations;chest pain;lightheadedness;dizziness Positive for;edema little swelling in feet  Gastroenterology: Negative      Genitourinary:  Negative      Musculoskeletal:  Negative      Neurologic:  Negative      Psychiatric:  Negative      Heme/Lymph/Imm:  Positive for allergies    Endocrine:  Negative        Physical Exam:  Vitals: /70 (BP Location: Right arm, Patient Position: Sitting, Cuff Size: Adult Regular)   Pulse 74   Ht 1.676 m (5' 6\")   Wt 53.8 kg (118 lb 11.2 oz)   SpO2 98%   BMI 19.16 kg/m      Constitutional:           Skin:             Head:           Eyes:           Lymph:      ENT:           Neck:           Respiratory:  "           Cardiac:                                                           GI:           Extremities and Muscular Skeletal:                 Neurological:           Psych:           CC  Matt Mccracken PA-C  [address] [place] [address]                  Thank you for allowing me to participate in the care of your patient.      Sincerely,     MARGAUX WILLIAMSON MD     [facility]  cc:   Matt Mccracken PA-C  [address] [address]

## 2024-12-23 NOTE — PATIENT INSTRUCTIONS
Counseling and Referral of Other Preventative  (Italic type indicates deductible and co-insurance are waived)    Patient Name: Baldev Albarado  Today's Date: 12/23/2024    Health Maintenance       Date Due Completion Date    Eye Exam Never done ---    TETANUS VACCINE Never done ---    Pneumococcal Vaccines (Age 50+) (1 of 2 - PCV) Never done ---    Shingles Vaccine (1 of 2) Never done ---    RSV Vaccine (Age 60+ and Pregnant patients) (1 - 1-dose 75+ series) Never done ---    Diabetes Urine Screening 04/20/2024 4/20/2023    COVID-19 Vaccine (4 - 2024-25 season) 09/01/2024 11/19/2021    Hemoglobin A1c 03/03/2025 9/3/2024    Lipid Panel 11/20/2025 11/20/2024        No orders of the defined types were placed in this encounter.      The following information is provided to all patients.  This information is to help you find resources for any of the problems found today that may be affecting your health:                  Living healthy guide: [place].gov    Understanding Diabetes: www.diabetes.org      Eating healthy: www.cdc.gov/healthyweight      CDC home safety checklist: www.cdc.gov/steadi/patient.html      Agency on Aging: [place].gov    Alcoholics anonymous (AA): www.aa.org      Physical Activity: www.rancho.nih.gov/ef6ahfi      Tobacco use: [place].gov

## 2025-01-13 LAB
LEFT EYE DM RETINOPATHY: NEGATIVE
LEFT EYE DM RETINOPATHY: NEGATIVE
RIGHT EYE DM RETINOPATHY: NEGATIVE
RIGHT EYE DM RETINOPATHY: NEGATIVE

## 2025-01-14 ENCOUNTER — PATIENT OUTREACH (OUTPATIENT)
Facility: HOSPITAL | Age: 85
End: 2025-01-14
Payer: MEDICARE

## 2025-01-14 NOTE — PROGRESS NOTES
01/14/2025   --Chart accessed for: Care Gaps  Immunization Database (Immprint/MIXX) reviewed.   Requested eye exam records from [facility] Due   Topic Date Due    Diabetic Eye Exam  Never done    TETANUS VACCINE  Never done    Pneumococcal Vaccines (Age 50+) (1 of 2 - PCV) Never done    Shingles Vaccine (1 of 2) Never done    RSV Vaccine (Age 60+ and Pregnant patients) (1 - 1-dose 75+ series) Never done    COVID-19 Vaccine (4 - 2024-25 season) 09/01/2024

## 2025-01-14 NOTE — LETTER
AUTHORIZATION FOR RELEASE OF   CONFIDENTIAL INFORMATION    Dear [facility],    We are seeing Baldev Albarado, date of birth 1940, in the clinic at [facility]. Isidro Rosenthal MD is the patient's PCP. Baldev Albarado has an outstanding lab/procedure at the time we reviewed his chart. In order to help keep his health information updated, he has authorized us to request the following medical record(s):        (  )  MAMMOGRAM                                      (  )  COLONOSCOPY      (  )  PAP SMEAR                                          (  )  OUTSIDE LAB RESULTS     (  )  DEXA SCAN                                          (X) RECENT EYE EXAM            (  )  FOOT EXAM                                          (  )  ENTIRE RECORD     (  )  OUTSIDE IMMUNIZATIONS                 (  )  _______________         Please fax records to [facility]inator, Karen Henderson, [phone].     If you have any questions, please contact [phone].          Patient Name: Baldev Albarado  : 1940  Patient Phone #: [phone]

## 2025-01-15 ENCOUNTER — PATIENT OUTREACH (OUTPATIENT)
Facility: HOSPITAL | Age: 85
End: 2025-01-15
Payer: MEDICARE

## 2025-01-15 NOTE — PROGRESS NOTES
Uploaded eye exam from [facility], repeat in 1 year

## 2025-01-18 DIAGNOSIS — F43.29 GRIEF REACTION WITH PROLONGED BEREAVEMENT: ICD-10-CM

## 2025-01-21 RX ORDER — CITALOPRAM 10 MG/1
10 TABLET ORAL
Qty: 90 TABLET | Refills: 0 | Status: SHIPPED | OUTPATIENT
Start: 2025-01-21

## 2025-02-17 ENCOUNTER — PATIENT OUTREACH (OUTPATIENT)
Facility: HOSPITAL | Age: 85
End: 2025-02-17
Payer: MEDICARE

## 2025-02-17 NOTE — PROGRESS NOTES
Uploaded eye exam from [facility], repeat in 1 year

## 2025-03-11 ENCOUNTER — OFFICE VISIT (OUTPATIENT)
Dept: FAMILY MEDICINE | Facility: CLINIC | Age: 85
End: 2025-03-11
Payer: MEDICARE

## 2025-03-11 VITALS
TEMPERATURE: 98 F | DIASTOLIC BLOOD PRESSURE: 60 MMHG | SYSTOLIC BLOOD PRESSURE: 104 MMHG | HEIGHT: 69 IN | WEIGHT: 165.63 LBS | BODY MASS INDEX: 24.53 KG/M2 | HEART RATE: 74 BPM | OXYGEN SATURATION: 98 %

## 2025-03-11 DIAGNOSIS — R41.3 MEMORY CHANGES: ICD-10-CM

## 2025-03-11 DIAGNOSIS — F43.29 GRIEF REACTION WITH PROLONGED BEREAVEMENT: ICD-10-CM

## 2025-03-11 DIAGNOSIS — I10 ESSENTIAL HYPERTENSION: ICD-10-CM

## 2025-03-11 DIAGNOSIS — R79.9 ABNORMAL FINDING OF BLOOD CHEMISTRY, UNSPECIFIED: ICD-10-CM

## 2025-03-11 DIAGNOSIS — R01.1 SYSTOLIC MURMUR: Primary | ICD-10-CM

## 2025-03-11 DIAGNOSIS — M10.9 GOUT, UNSPECIFIED CAUSE, UNSPECIFIED CHRONICITY, UNSPECIFIED SITE: ICD-10-CM

## 2025-03-11 DIAGNOSIS — N40.0 BENIGN PROSTATIC HYPERPLASIA, UNSPECIFIED WHETHER LOWER URINARY TRACT SYMPTOMS PRESENT: ICD-10-CM

## 2025-03-11 DIAGNOSIS — R41.89 COGNITIVE IMPAIRMENT: ICD-10-CM

## 2025-03-11 DIAGNOSIS — Z79.899 OTHER LONG TERM (CURRENT) DRUG THERAPY: ICD-10-CM

## 2025-03-11 LAB
25(OH)D3 SERPL-MCNC: 21.6 NG/ML (ref 30–80)
EST. AVERAGE GLUCOSE BLD GHB EST-MCNC: 131 MG/DL
FOLATE SERPL-MCNC: 12.9 NG/ML (ref 7–31.4)
HBA1C MFR BLD HPLC: 6.2 %
TSH SERPL DL<=0.005 MIU/L-ACNC: 1.38 UIU/ML (ref 0.35–4.94)
VIT B12 SERPL-MCNC: 297 PG/ML (ref 213–816)

## 2025-03-11 PROCEDURE — 82306 VITAMIN D 25 HYDROXY: CPT | Mod: ,,, | Performed by: CLINICAL MEDICAL LABORATORY

## 2025-03-11 PROCEDURE — 84443 ASSAY THYROID STIM HORMONE: CPT | Mod: ,,, | Performed by: CLINICAL MEDICAL LABORATORY

## 2025-03-11 PROCEDURE — G2211 COMPLEX E/M VISIT ADD ON: HCPCS | Mod: ,,, | Performed by: STUDENT IN AN ORGANIZED HEALTH CARE EDUCATION/TRAINING PROGRAM

## 2025-03-11 PROCEDURE — 82607 VITAMIN B-12: CPT | Mod: ,,, | Performed by: CLINICAL MEDICAL LABORATORY

## 2025-03-11 PROCEDURE — 83036 HEMOGLOBIN GLYCOSYLATED A1C: CPT | Mod: ,,, | Performed by: CLINICAL MEDICAL LABORATORY

## 2025-03-11 PROCEDURE — 82746 ASSAY OF FOLIC ACID SERUM: CPT | Mod: ,,, | Performed by: CLINICAL MEDICAL LABORATORY

## 2025-03-11 PROCEDURE — 99213 OFFICE O/P EST LOW 20 MIN: CPT | Mod: ,,, | Performed by: STUDENT IN AN ORGANIZED HEALTH CARE EDUCATION/TRAINING PROGRAM

## 2025-03-11 RX ORDER — LISINOPRIL 20 MG/1
20 TABLET ORAL DAILY
Qty: 90 TABLET | Refills: 1 | Status: SHIPPED | OUTPATIENT
Start: 2025-03-11 | End: 2025-09-07

## 2025-03-11 RX ORDER — CITALOPRAM 10 MG/1
10 TABLET ORAL DAILY
Qty: 90 TABLET | Refills: 1 | Status: SHIPPED | OUTPATIENT
Start: 2025-03-11 | End: 2025-09-07

## 2025-03-11 RX ORDER — ATORVASTATIN CALCIUM 40 MG/1
40 TABLET, FILM COATED ORAL NIGHTLY
Qty: 90 TABLET | Refills: 1 | Status: SHIPPED | OUTPATIENT
Start: 2025-03-11 | End: 2025-09-07

## 2025-03-11 RX ORDER — TICAGRELOR 90 MG/1
90 TABLET ORAL 2 TIMES DAILY
Qty: 180 TABLET | Refills: 1 | Status: SHIPPED | OUTPATIENT
Start: 2025-03-11 | End: 2025-09-07

## 2025-03-11 RX ORDER — TAMSULOSIN HYDROCHLORIDE 0.4 MG/1
0.4 CAPSULE ORAL DAILY
Qty: 90 CAPSULE | Refills: 1 | Status: SHIPPED | OUTPATIENT
Start: 2025-03-11 | End: 2025-09-07

## 2025-03-11 RX ORDER — ALLOPURINOL 300 MG/1
300 TABLET ORAL DAILY
Qty: 90 TABLET | Refills: 2 | Status: SHIPPED | OUTPATIENT
Start: 2025-03-11

## 2025-03-11 NOTE — LETTER
March 11, 2025      [facility] [address]  [place] [address]  Phone: [phone]  Fax: [phone]       Patient: Baldev Albarado   YOB: 1940  Date of Visit: 03/11/2025    To Whom It May Concern:    German Albarado  was at [facility] on 03/11/2025. His daughter-in-law accompanied him to the appointment as he cannot drive. Please excuse her from work on 3/11/25. If you have any questions or concerns, or if I can be of further assistance, please do not hesitate to contact me.    Sincerely,    Isidro Rosenthal MD

## 2025-03-11 NOTE — PROGRESS NOTES
"Subjective     Patient ID: Baldev Albarado is a 84 y.o. male.    Chief Complaint: Follow-up (Post cardiac stent - saw cardiologist last week), Hypertension, and Medication Refill    HPI    Baldev Albarado is a 84 y.o. patient with the medical problems listed below who comes to clinic for refill of their medication and follow-up for their chronic medical conditions.    Recently had PCI; no chest pain and he is compliant with DAPT and statin.     DIL concerned about memory changes; sometimes has word finding difficulties but no other concerns.     Did have head injury last year.    Will check  B12, folate, vitamin D,  MMA, homocysteine  TSH,  Lipid panel, A1c     Abnormal mini-cog - clock, 2/3 words. Ordered MRI brain.     Objective   /60   Pulse 74   Temp 97.6 °F (36.4 °C)   Ht 5' 9" (1.753 m)   Wt 75.1 kg (165 lb 9.6 oz)   SpO2 98%   BMI 24.45 kg/m²     Physical Exam  Gen: well-groomed, well-dressed. No apparent distress  HEENT:  NCAT, symmetric  Pulm: normal work of breathing, no accessory muscle use; speaking complete sentences without having to stop  Neuro: CN II-XII grossly normal   Psych: pleasant affect, congruent mood. Linear thought; good eye contact  SKIN: no rashes present on face, neck, hands       Assessment and Plan     Problem List Items Addressed This Visit       Essential hypertension    Relevant Medications    atorvastatin (LIPITOR) 40 MG tablet    lisinopriL (PRINIVIL,ZESTRIL) 20 MG tablet    Benign prostatic hyperplasia    Relevant Medications    tamsulosin (FLOMAX) 0.4 mg Cap    Gout    Relevant Medications    allopurinoL (ZYLOPRIM) 300 MG tablet    Grief reaction with prolonged bereavement    Relevant Medications    citalopram (CELEXA) 10 MG tablet     Other Visit Diagnoses         Systolic murmur    -  Primary    Relevant Medications    BRILINTA 90 mg tablet      Memory changes        Relevant Orders    Vitamin B12 & Folate    Vitamin D    TSH    Hemoglobin A1C    MRI Brain " Without Contrast      Cognitive impairment        Relevant Orders    Vitamin B12 & Folate    Vitamin D    TSH    Hemoglobin A1C    MRI Brain Without Contrast      Other long term (current) drug therapy        Relevant Orders    Vitamin D      Abnormal finding of blood chemistry, unspecified        Relevant Orders    Hemoglobin A1C          Mood is ok - on celexa; staying engaged with house/yard work, friend calls him every day to pray with him. Sometimes get lonely at times.     Memory testing abnormal with mini-cog; obtain lab work and initial imaging, [facility] at next visit.

## 2025-03-21 ENCOUNTER — HOSPITAL ENCOUNTER (OUTPATIENT)
Dept: RADIOLOGY | Facility: HOSPITAL | Age: 85
Discharge: HOME | End: 2025-03-21
Attending: STUDENT IN AN ORGANIZED HEALTH CARE EDUCATION/TRAINING PROGRAM
Payer: MEDICARE

## 2025-03-21 DIAGNOSIS — R41.3 MEMORY CHANGES: ICD-10-CM

## 2025-03-21 DIAGNOSIS — R41.89 COGNITIVE IMPAIRMENT: ICD-10-CM

## 2025-03-21 PROCEDURE — 70551 MRI BRAIN STEM W/O DYE: CPT | Mod: TC

## 2025-03-21 PROCEDURE — 70551 MRI BRAIN STEM W/O DYE: CPT | Mod: 26,,, | Performed by: RADIOLOGY

## 2025-04-08 ENCOUNTER — RESULTS FOLLOW-UP (OUTPATIENT)
Dept: FAMILY MEDICINE | Facility: CLINIC | Age: 85
End: 2025-04-08
Payer: MEDICARE

## 2025-04-08 DIAGNOSIS — R79.89 LOW VITAMIN D LEVEL: Primary | ICD-10-CM

## 2025-04-08 RX ORDER — ERGOCALCIFEROL (VITAMIN D2) 50 MCG
1 CAPSULE ORAL WEEKLY
Qty: 8 CAPSULE | Refills: 0 | Status: SHIPPED | OUTPATIENT
Start: 2025-04-08

## 2025-04-11 ENCOUNTER — TELEPHONE (OUTPATIENT)
Dept: FAMILY MEDICINE | Facility: CLINIC | Age: 85
End: 2025-04-11
Payer: MEDICARE

## 2025-04-11 NOTE — TELEPHONE ENCOUNTER
----- Message from Isidro Rosenthal MD sent at 4/8/2025  3:08 PM CDT -----  Please contact the patient and let them know that his vitamin B level was low, and his vitamin D level was low.    I recommend high dose vitamin D replacement for the next 8 weeks (1 capsule every week). I will send this in to pharmacy.     He should also start taking an OTC vitamin B supplement.     We will recheck both levels at his next appointment.  ----- Message -----  From: Lab, Background User  Sent: 3/11/2025   8:49 PM CDT  To: Isidro Rosenthal MD

## 2025-05-27 ENCOUNTER — OFFICE VISIT (OUTPATIENT)
Dept: FAMILY MEDICINE | Facility: CLINIC | Age: 85
End: 2025-05-27
Payer: MEDICARE

## 2025-05-27 VITALS
HEIGHT: 69 IN | SYSTOLIC BLOOD PRESSURE: 104 MMHG | BODY MASS INDEX: 23.53 KG/M2 | DIASTOLIC BLOOD PRESSURE: 57 MMHG | TEMPERATURE: 98 F | HEART RATE: 74 BPM | OXYGEN SATURATION: 99 % | RESPIRATION RATE: 20 BRPM | WEIGHT: 158.88 LBS

## 2025-05-27 DIAGNOSIS — G31.84 MILD COGNITIVE IMPAIRMENT: ICD-10-CM

## 2025-05-27 DIAGNOSIS — N40.0 BENIGN PROSTATIC HYPERPLASIA, UNSPECIFIED WHETHER LOWER URINARY TRACT SYMPTOMS PRESENT: ICD-10-CM

## 2025-05-27 DIAGNOSIS — I10 ESSENTIAL HYPERTENSION: ICD-10-CM

## 2025-05-27 DIAGNOSIS — R79.89 LOW VITAMIN D LEVEL: ICD-10-CM

## 2025-05-27 DIAGNOSIS — F43.29 GRIEF REACTION WITH PROLONGED BEREAVEMENT: ICD-10-CM

## 2025-05-27 DIAGNOSIS — M10.9 GOUT, UNSPECIFIED CAUSE, UNSPECIFIED CHRONICITY, UNSPECIFIED SITE: ICD-10-CM

## 2025-05-27 DIAGNOSIS — E53.8 LOW SERUM VITAMIN B12: Primary | ICD-10-CM

## 2025-05-27 PROCEDURE — 96372 THER/PROPH/DIAG INJ SC/IM: CPT | Mod: ,,, | Performed by: STUDENT IN AN ORGANIZED HEALTH CARE EDUCATION/TRAINING PROGRAM

## 2025-05-27 PROCEDURE — 99213 OFFICE O/P EST LOW 20 MIN: CPT | Mod: ,,, | Performed by: STUDENT IN AN ORGANIZED HEALTH CARE EDUCATION/TRAINING PROGRAM

## 2025-05-27 RX ORDER — CYANOCOBALAMIN 1000 UG/ML
1000 INJECTION, SOLUTION INTRAMUSCULAR; SUBCUTANEOUS
Status: COMPLETED | OUTPATIENT
Start: 2025-05-27 | End: 2025-05-27

## 2025-05-27 RX ORDER — TAMSULOSIN HYDROCHLORIDE 0.4 MG/1
0.4 CAPSULE ORAL DAILY
Qty: 90 CAPSULE | Refills: 1 | Status: SHIPPED | OUTPATIENT
Start: 2025-05-27 | End: 2025-11-23

## 2025-05-27 RX ORDER — MULTIVIT WITH MINERALS/HERBS
1 TABLET ORAL DAILY
COMMUNITY

## 2025-05-27 RX ORDER — LISINOPRIL 20 MG/1
20 TABLET ORAL DAILY
Qty: 90 TABLET | Refills: 1 | Status: SHIPPED | OUTPATIENT
Start: 2025-05-27 | End: 2025-11-23

## 2025-05-27 RX ORDER — ALLOPURINOL 300 MG/1
300 TABLET ORAL DAILY
Qty: 90 TABLET | Refills: 2 | Status: SHIPPED | OUTPATIENT
Start: 2025-05-27

## 2025-05-27 RX ORDER — ERGOCALCIFEROL (VITAMIN D2) 50 MCG
1 CAPSULE ORAL WEEKLY
Qty: 8 CAPSULE | Refills: 0 | Status: CANCELLED | OUTPATIENT
Start: 2025-05-27

## 2025-05-27 RX ORDER — ATORVASTATIN CALCIUM 40 MG/1
40 TABLET, FILM COATED ORAL NIGHTLY
Qty: 90 TABLET | Refills: 1 | Status: SHIPPED | OUTPATIENT
Start: 2025-05-27 | End: 2025-11-23

## 2025-05-27 RX ORDER — CITALOPRAM 10 MG/1
10 TABLET ORAL DAILY
Qty: 90 TABLET | Refills: 1 | Status: SHIPPED | OUTPATIENT
Start: 2025-05-27 | End: 2025-11-23

## 2025-05-27 RX ADMIN — CYANOCOBALAMIN 1000 MCG: 1000 INJECTION, SOLUTION INTRAMUSCULAR; SUBCUTANEOUS at 04:05

## 2025-05-27 NOTE — PROGRESS NOTES
"Subjective     Patient ID: Baldev Albarado is a 85 y.o. male.    Chief Complaint: Follow-up (Follow up  from vitamin D being lown and refills on allopurinol, lipitor, lisinopril,celexa, vitamin D and flomax/also wanting to discuss home health ) and Health Maintenance (TETANUS VACCINE Never done/Shingles Vaccine(1 of 2) Never done/Pneumococcal Vaccines (Age 50+)(2 of 2 - PCV) due on 11/19/2004/RSV Vaccine (Age 60+ and Pregnant patients)(1 - 1-dose 75+ series) Never done/COVID-19 Vaccine(4 - 2024-25 season) due on 09/01/2024/Encouraged to go to pharmacy for tetanus, shingles, rsv vaccince /Declined covid)    HPI    Baldev Albarado is a 85 y.o. patient with the medical problems listed below who comes to clinic for follow-up for memory impairment.     He has been doing fairly well and completing high dose vitamin D supplementation this week.      Objective   BP (!) 104/57 (BP Location: Right arm, Patient Position: Sitting)   Pulse 74   Temp 97.9 °F (36.6 °C) (Oral)   Resp 20   Ht 5' 9" (1.753 m)   Wt 72.1 kg (158 lb 14.4 oz)   SpO2 99%   BMI 23.47 kg/m²     Physical Exam  Gen: well-groomed, well-dressed. No apparent distress  HEENT:  NCAT, symmetric  Pulm: normal work of breathing, no accessory muscle use; speaking complete sentences without having to stop  Neuro: CN II-XII grossly normal   Psych: pleasant affect, congruent mood. Linear thought; good eye contact  SKIN: no rashes present on face, neck, hands    IMAGING:  EXAMINATION:  MRI BRAIN WITHOUT CONTRAST     CLINICAL HISTORY:  Mental status change, unknown cause;.  Other amnesia     TECHNIQUE:  Multiplanar multisequence MR imaging of the brain was performed without contrast.     COMPARISON:  None     FINDINGS:  Intracranial compartment:     Ventricles and sulci are normal in size for age without evidence of hydrocephalus. No extra-axial blood or fluid collections.     Moderate involutional changes and chronic microvascular ischemic changes in the " periventricular and subcortical white matter.  No mass lesion, acute hemorrhage, edema or acute infarct.     Normal vascular flow voids are preserved.     Skull/extracranial contents (limited evaluation): Bone marrow signal intensity is normal.     Impression:     1. No acute intracranial process.  2. Involutional changes with chronic microvascular ischemic changes.     Assessment and Plan     Problem List Items Addressed This Visit       Essential hypertension    Relevant Medications    lisinopriL (PRINIVIL,ZESTRIL) 20 MG tablet    atorvastatin (LIPITOR) 40 MG tablet    Benign prostatic hyperplasia    Relevant Medications    tamsulosin (FLOMAX) 0.4 mg Cap    Gout    Relevant Medications    allopurinoL (ZYLOPRIM) 300 MG tablet    Grief reaction with prolonged bereavement    Relevant Medications    citalopram (CELEXA) 10 MG tablet     Other Visit Diagnoses         Low serum vitamin B12    -  Primary    Relevant Medications    cyanocobalamin injection 1,000 mcg      Low vitamin D level                home health for help with iADLs; Memory testing abnormal with mini-cog; lab work showed some vitamin deficiencies. initial imaging complete. , MoCA at next visit.     Low b12 and low vitamin D. 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     May trial donepezil to see if memory improves.     Repleting b12 and vitamin D but still getting confused; mixing up days of the week. Very pleasant and in no distress over this.

## 2025-05-27 NOTE — PATIENT INSTRUCTIONS
After you complete the weekly high-dose vitamin D supplementation, you can  an over the counter supplement - 2,000 IU per day.

## 2025-08-05 ENCOUNTER — OFFICE VISIT (OUTPATIENT)
Dept: FAMILY MEDICINE | Facility: CLINIC | Age: 85
End: 2025-08-05
Payer: MEDICARE

## 2025-08-05 VITALS
SYSTOLIC BLOOD PRESSURE: 110 MMHG | DIASTOLIC BLOOD PRESSURE: 53 MMHG | HEART RATE: 64 BPM | TEMPERATURE: 98 F | BODY MASS INDEX: 23.7 KG/M2 | HEIGHT: 69 IN | WEIGHT: 160 LBS | OXYGEN SATURATION: 97 %

## 2025-08-05 DIAGNOSIS — F43.29 GRIEF REACTION WITH PROLONGED BEREAVEMENT: ICD-10-CM

## 2025-08-05 DIAGNOSIS — R01.1 SYSTOLIC MURMUR: ICD-10-CM

## 2025-08-05 DIAGNOSIS — F22 DELUSIONAL DISORDER: Primary | ICD-10-CM

## 2025-08-05 DIAGNOSIS — I25.118 CORONARY ARTERY DISEASE OF NATIVE ARTERY OF NATIVE HEART WITH STABLE ANGINA PECTORIS: Chronic | ICD-10-CM

## 2025-08-05 PROBLEM — I25.10 CORONARY ARTERIOSCLEROSIS: Status: ACTIVE | Noted: 2025-08-05

## 2025-08-05 PROCEDURE — 99213 OFFICE O/P EST LOW 20 MIN: CPT | Mod: ,,, | Performed by: STUDENT IN AN ORGANIZED HEALTH CARE EDUCATION/TRAINING PROGRAM

## 2025-08-05 PROCEDURE — G2211 COMPLEX E/M VISIT ADD ON: HCPCS | Mod: ,,, | Performed by: STUDENT IN AN ORGANIZED HEALTH CARE EDUCATION/TRAINING PROGRAM

## 2025-08-05 RX ORDER — CHOLECALCIFEROL (VITAMIN D3) 25 MCG
1000 TABLET ORAL DAILY
COMMUNITY

## 2025-08-05 RX ORDER — CITALOPRAM 20 MG/1
20 TABLET ORAL DAILY
Qty: 90 TABLET | Refills: 1 | Status: SHIPPED | OUTPATIENT
Start: 2025-08-05 | End: 2026-02-01

## 2025-08-05 RX ORDER — TICAGRELOR 90 MG/1
90 TABLET ORAL 2 TIMES DAILY
Qty: 180 TABLET | Refills: 1 | Status: SHIPPED | OUTPATIENT
Start: 2025-08-05 | End: 2026-02-01

## 2025-08-05 NOTE — PROGRESS NOTES
Subjective     Patient ID: Baldev Albarado is a 85 y.o. male.    Chief Complaint: Depression (Severe depression that started about 3 weeks ago)    HPI    Baldev Albarado is a 85 y.o. patient with the medical problems listed below who comes to clinic for refill of their medication and follow-up for their chronic medical conditions.    His daughter-in-law also tells me he had some AMS that waxed/waned for about 3 weeks where he started having paranoid delusions about his  wife. He was intermittently aware that these were delusional thoughts, which was deeply distressing and confusing to him. His family noticed an improvement last week and he also feels better with no return of the delusional ideation.     COMPREHENSIVE ASSESSMENT AND DEMENTIA CARE PLAN    1. Cognitive evaluation including history and exam  Pertinent history: abnormal mini-cog at recent AWV; daughter-in-law does report he is forgetful and does need assistance with iADLs but independent in all ADLS.  As the initial visit blood work was obtained including TSH B12 and vitamin D,he continues to be on vitamin D and B12 supplement. MRI brain results as noted below.     2. Functional assessment, including decision making capacity  Independent in ADLs; does have decision-making capacity.     3. Stage of dementia using standardized instruments  Best estimated stage (indicate scale - CDR, Fast, other): CDR 1      4. Best Estimated Decision Making Capacity, as of today's assessment  [x] Probably can make higher level decisions (such as whether to undergo or withdraw life-sustaining treatments that require understanding the nature, probable consequences, burdens, and risks of proposed treatment)  [ ] Probably can make limited decision that require simple understanding  [ ] Probably can express agreement with decisions proposed by someone else  [ ] Cannot effectively participated in any kind of health care decision-making      5. Medication  "reconciliation and review  Ability to self administer medications:  [ ] Independently without assistance  [ ] Needs assistance for set up then can do so independently  [x] Can do so with some assistance including set up, reminders or supervision only  [ ] Needs medications administered by someone else  High risk medications discussed include: (or n/a) na     6. Evaluation of neuropsychiatric and behavioral symptoms  Is depression a concern? Yes   Is anxiety a concern? To some degree :  Notable symptoms discussed: paranoi, delusional symptoms, tearful affect      7. Evaluation of safety at home including driving    Does not drive and home environment is safe; was previously becoming lost in the car.        8. Identification of caregivers, their knowledge, needs, and support  Caregiver Stress Thermometer (not at all, a little, moderate, very, extremely):  Check if answers yes to the question:  [x ] Do you know where you can receive support as a caregiver?  [ x] Do you know where you can obtain additional information about dementia?  [x ] Do you know how to connect to resources in your community?  [x ] Do you have a plan in place if you?re unable to be with your loved one (the patient)?     9. What Matters Most: Advance Care Planning and Goals of Care    Improving mood and spending time with family.            Objective   BP (!) 110/53   Pulse 64   Temp 98 °F (36.7 °C)   Ht 5' 9" (1.753 m)   Wt 72.6 kg (160 lb)   SpO2 97%   BMI 23.63 kg/m²     Physical Exam  Gen: well-groomed, well-dressed. No apparent distress  HEENT:  NCAT, symmetric  Pulm: normal work of breathing, no accessory muscle use; speaking complete sentences without having to stop  Neuro: CN II-XII grossly normal   Psych: pleasant affect, congruent mood. Linear thought; good eye contact  SKIN: no rashes present on face, neck, hands      IMAGING:      Details    Reading Physician Reading Date Result Priority   Leland Bashir MD  [phone]  " 3/23/2025 Routine     Narrative & Impression  EXAMINATION:  MRI BRAIN WITHOUT CONTRAST     CLINICAL HISTORY:  Mental status change, unknown cause;.  Other amnesia     TECHNIQUE:  Multiplanar multisequence MR imaging of the brain was performed without contrast.     COMPARISON:  None     FINDINGS:  Intracranial compartment:     Ventricles and sulci are normal in size for age without evidence of hydrocephalus. No extra-axial blood or fluid collections.     Moderate involutional changes and chronic microvascular ischemic changes in the periventricular and subcortical white matter.  No mass lesion, acute hemorrhage, edema or acute infarct.     Normal vascular flow voids are preserved.     Skull/extracranial contents (limited evaluation): Bone marrow signal intensity is normal.     Impression:     1. No acute intracranial process.  2. Involutional changes with chronic microvascular ischemic changes.        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 He does have memory impairment     Will refer to [facility] for depression and behavioral activation. [place].  [place] from [place].      Does still have some dizziness; may not tolerate donepezil due to borderline low heart rate. May benefit from memantine. Referring to neurology to assist in workup mgmt of likely dementia with behavioral disturbances. Would benefit from comprehensive  dementia plan.

## 2025-08-19 ENCOUNTER — HOSPITAL ENCOUNTER (EMERGENCY)
Facility: HOSPITAL | Age: 85
Discharge: ANOTHER HOSPITAL | End: 2025-08-19
Payer: MEDICARE

## 2025-08-19 ENCOUNTER — OFFICE VISIT (OUTPATIENT)
Dept: FAMILY MEDICINE | Facility: CLINIC | Age: 85
End: 2025-08-19
Payer: MEDICARE

## 2025-08-19 VITALS
TEMPERATURE: 99 F | OXYGEN SATURATION: 97 % | WEIGHT: 159 LBS | HEIGHT: 69 IN | BODY MASS INDEX: 23.55 KG/M2 | SYSTOLIC BLOOD PRESSURE: 90 MMHG | DIASTOLIC BLOOD PRESSURE: 52 MMHG | HEART RATE: 86 BPM

## 2025-08-19 VITALS
HEART RATE: 86 BPM | DIASTOLIC BLOOD PRESSURE: 48 MMHG | BODY MASS INDEX: 23.48 KG/M2 | OXYGEN SATURATION: 95 % | RESPIRATION RATE: 20 BRPM | TEMPERATURE: 98 F | WEIGHT: 159 LBS | SYSTOLIC BLOOD PRESSURE: 103 MMHG
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
## 2025-08-19 DIAGNOSIS — U07.1 COVID-19 VIRUS DETECTED: ICD-10-CM

## 2025-08-19 DIAGNOSIS — R30.0 DYSURIA: Primary | ICD-10-CM

## 2025-08-19 DIAGNOSIS — U07.1 COVID: Primary | ICD-10-CM

## 2025-08-19 DIAGNOSIS — R05.9 COUGH: ICD-10-CM

## 2025-08-19 DIAGNOSIS — R41.0 CONFUSION: ICD-10-CM

## 2025-08-19 LAB
ALBUMIN SERPL BCP-MCNC: 2.8 G/DL (ref 3.4–4.8)
ALBUMIN/GLOB SERPL: 0.9 {RATIO}
ALP SERPL-CCNC: 55 U/L (ref 40–150)
ALT SERPL W P-5'-P-CCNC: 41 U/L
ANION GAP SERPL CALCULATED.3IONS-SCNC: 13 MMOL/L (ref 7–16)
AST SERPL W P-5'-P-CCNC: 81 U/L (ref 11–45)
BASOPHILS # BLD AUTO: 0.01 K/UL (ref 0–0.2)
BASOPHILS NFR BLD AUTO: 0.1 % (ref 0–1)
BILIRUB SERPL-MCNC: 0.9 MG/DL
BILIRUB SERPL-MCNC: ABNORMAL MG/DL
BLOOD URINE, POC: ABNORMAL
BUN SERPL-MCNC: 42 MG/DL (ref 8–26)
BUN/CREAT SERPL: 33 (ref 6–20)
CALCIUM SERPL-MCNC: 8.5 MG/DL (ref 8.8–10)
CHLORIDE SERPL-SCNC: 110 MMOL/L (ref 98–107)
CK SERPL-CCNC: 632 U/L (ref 30–200)
CLARITY, UA: CLEAR
CO2 SERPL-SCNC: 18 MMOL/L (ref 23–31)
COLOR, UA: YELLOW
CREAT SERPL-MCNC: 1.27 MG/DL (ref 0.72–1.25)
EGFR (NO RACE VARIABLE) (RUSH/TITUS): 55 ML/MIN/1.73M2
EOSINOPHIL # BLD AUTO: 0 K/UL (ref 0–0.5)
EOSINOPHIL NFR BLD AUTO: 0 % (ref 1–4)
ERYTHROCYTE [DISTWIDTH] IN BLOOD BY AUTOMATED COUNT: 15 % (ref 11.5–14.5)
GLOBULIN SER-MCNC: 3.2 G/DL (ref 2–4)
GLUCOSE SERPL-MCNC: 124 MG/DL (ref 82–115)
GLUCOSE UR QL STRIP: ABNORMAL
HCT VFR BLD AUTO: 35.1 % (ref 40–54)
HGB BLD-MCNC: 11.1 G/DL (ref 13.5–18)
INR BLD: 1.1
KETONES UR QL STRIP: ABNORMAL
LACTATE SERPL-SCNC: 1.1 MMOL/L (ref 0.5–2.2)
LEUKOCYTE ESTERASE URINE, POC: ABNORMAL
LYMPHOCYTES # BLD AUTO: 2 K/UL (ref 1–4.8)
LYMPHOCYTES NFR BLD AUTO: 16.2 % (ref 27–41)
MAGNESIUM SERPL-MCNC: 1.9 MG/DL (ref 1.6–2.6)
MCH RBC QN AUTO: 30.7 PG (ref 27–31)
MCHC RBC AUTO-ENTMCNC: 31.6 G/DL (ref 32–36)
MCV RBC AUTO: 97 FL (ref 80–96)
MONOCYTES # BLD AUTO: 0.52 K/UL (ref 0–0.8)
MONOCYTES NFR BLD AUTO: 4.2 % (ref 2–6)
MPC BLD CALC-MCNC: ABNORMAL G/DL
NEUTROPHILS # BLD AUTO: 9.84 K/UL (ref 1.8–7.7)
NEUTROPHILS NFR BLD AUTO: 79.5 % (ref 53–65)
NITRITE, POC UA: ABNORMAL
NT-PROBNP SERPL-MCNC: 711 PG/ML (ref 1–450)
PH, POC UA: 5.5
PLATELET # BLD AUTO: 75 K/UL (ref 150–400)
POTASSIUM SERPL-SCNC: 4 MMOL/L (ref 3.5–5.1)
PROT SERPL-MCNC: 6 G/DL (ref 5.8–7.6)
PROTEIN, POC: 30
PROTHROMBIN TIME: 14.3 SECONDS (ref 11.7–14.7)
RBC # BLD AUTO: 3.62 M/UL (ref 4.6–6.2)
SARS-COV-2 RDRP RESP QL NAA+PROBE: POSITIVE
SODIUM SERPL-SCNC: 137 MMOL/L (ref 136–145)
SPECIFIC GRAVITY, POC UA: 1.02
TROPONIN I SERPL HS-MCNC: 70.1 NG/L
TROPONIN I SERPL HS-MCNC: 70.1 NG/L
UROBILINOGEN, POC UA: 0.2
WBC # BLD AUTO: 12.37 K/UL (ref 4.5–11)

## 2025-08-19 PROCEDURE — 87635 SARS-COV-2 COVID-19 AMP PRB: CPT | Performed by: NURSE PRACTITIONER

## 2025-08-19 PROCEDURE — 83605 ASSAY OF LACTIC ACID: CPT | Performed by: NURSE PRACTITIONER

## 2025-08-19 PROCEDURE — 93010 ELECTROCARDIOGRAM REPORT: CPT | Mod: ,,, | Performed by: INTERNAL MEDICINE

## 2025-08-19 PROCEDURE — 84484 ASSAY OF TROPONIN QUANT: CPT | Performed by: NURSE PRACTITIONER

## 2025-08-19 PROCEDURE — 93005 ELECTROCARDIOGRAM TRACING: CPT

## 2025-08-19 PROCEDURE — 83735 ASSAY OF MAGNESIUM: CPT | Performed by: NURSE PRACTITIONER

## 2025-08-19 PROCEDURE — 83880 ASSAY OF NATRIURETIC PEPTIDE: CPT | Performed by: NURSE PRACTITIONER

## 2025-08-19 PROCEDURE — 82550 ASSAY OF CK (CPK): CPT | Performed by: NURSE PRACTITIONER

## 2025-08-19 PROCEDURE — 99285 EMERGENCY DEPT VISIT HI MDM: CPT | Mod: GF,EDII,, | Performed by: NURSE PRACTITIONER

## 2025-08-19 PROCEDURE — 96374 THER/PROPH/DIAG INJ IV PUSH: CPT

## 2025-08-19 PROCEDURE — 85610 PROTHROMBIN TIME: CPT | Performed by: NURSE PRACTITIONER

## 2025-08-19 PROCEDURE — 85025 COMPLETE CBC W/AUTO DIFF WBC: CPT | Performed by: NURSE PRACTITIONER

## 2025-08-19 PROCEDURE — 99213 OFFICE O/P EST LOW 20 MIN: CPT | Mod: ,,, | Performed by: NURSE PRACTITIONER

## 2025-08-19 PROCEDURE — 25000003 PHARM REV CODE 250: Performed by: NURSE PRACTITIONER

## 2025-08-19 PROCEDURE — 99285 EMERGENCY DEPT VISIT HI MDM: CPT | Mod: 25

## 2025-08-19 PROCEDURE — 63600175 PHARM REV CODE 636 W HCPCS: Performed by: NURSE PRACTITIONER

## 2025-08-19 PROCEDURE — 80053 COMPREHEN METABOLIC PANEL: CPT | Performed by: NURSE PRACTITIONER

## 2025-08-19 RX ORDER — CEFTRIAXONE 1 G/1
1 INJECTION, POWDER, FOR SOLUTION INTRAMUSCULAR; INTRAVENOUS
Status: COMPLETED | OUTPATIENT
Start: 2025-08-19 | End: 2025-08-19

## 2025-08-19 RX ORDER — SODIUM CHLORIDE 9 MG/ML
1000 INJECTION, SOLUTION INTRAVENOUS
Status: COMPLETED | OUTPATIENT
Start: 2025-08-19 | End: 2025-08-19

## 2025-08-19 RX ADMIN — CEFTRIAXONE SODIUM 1 G: 1 INJECTION, POWDER, FOR SOLUTION INTRAMUSCULAR; INTRAVENOUS at 11:08

## 2025-08-19 RX ADMIN — SODIUM CHLORIDE 1000 ML: 9 INJECTION, SOLUTION INTRAVENOUS at 10:08

## 2025-08-20 LAB
OHS QRS DURATION: 74 MS
OHS QTC CALCULATION: 399 MS